# Patient Record
Sex: FEMALE | Race: WHITE | NOT HISPANIC OR LATINO
[De-identification: names, ages, dates, MRNs, and addresses within clinical notes are randomized per-mention and may not be internally consistent; named-entity substitution may affect disease eponyms.]

---

## 2017-06-28 ENCOUNTER — RESULT REVIEW (OUTPATIENT)
Age: 72
End: 2017-06-28

## 2018-10-03 ENCOUNTER — OUTPATIENT (OUTPATIENT)
Dept: OUTPATIENT SERVICES | Facility: HOSPITAL | Age: 73
LOS: 1 days | Discharge: HOME | End: 2018-10-03

## 2018-10-03 VITALS
OXYGEN SATURATION: 98 % | DIASTOLIC BLOOD PRESSURE: 81 MMHG | HEIGHT: 66 IN | HEART RATE: 73 BPM | WEIGHT: 129.85 LBS | SYSTOLIC BLOOD PRESSURE: 113 MMHG | TEMPERATURE: 99 F | RESPIRATION RATE: 16 BRPM

## 2018-10-03 DIAGNOSIS — Z98.890 OTHER SPECIFIED POSTPROCEDURAL STATES: Chronic | ICD-10-CM

## 2018-10-03 DIAGNOSIS — Z01.818 ENCOUNTER FOR OTHER PREPROCEDURAL EXAMINATION: ICD-10-CM

## 2018-10-03 DIAGNOSIS — F32.9 MAJOR DEPRESSIVE DISORDER, SINGLE EPISODE, UNSPECIFIED: ICD-10-CM

## 2018-10-03 DIAGNOSIS — M16.11 UNILATERAL PRIMARY OSTEOARTHRITIS, RIGHT HIP: ICD-10-CM

## 2018-10-03 LAB
ANION GAP SERPL CALC-SCNC: 4 MMOL/L — LOW (ref 5–17)
APTT BLD: 36.5 SEC — SIGNIFICANT CHANGE UP (ref 27.5–37.4)
BASOPHILS # BLD AUTO: 0.04 K/UL — SIGNIFICANT CHANGE UP (ref 0–0.2)
BASOPHILS NFR BLD AUTO: 0.8 % — SIGNIFICANT CHANGE UP (ref 0–2)
BLD GP AB SCN SERPL QL: SIGNIFICANT CHANGE UP
BUN SERPL-MCNC: 15 MG/DL — SIGNIFICANT CHANGE UP (ref 7–23)
CALCIUM SERPL-MCNC: 8.5 MG/DL — SIGNIFICANT CHANGE UP (ref 8.5–10.1)
CHLORIDE SERPL-SCNC: 104 MMOL/L — SIGNIFICANT CHANGE UP (ref 96–108)
CO2 SERPL-SCNC: 31 MMOL/L — SIGNIFICANT CHANGE UP (ref 22–31)
CREAT SERPL-MCNC: 0.93 MG/DL — SIGNIFICANT CHANGE UP (ref 0.5–1.3)
EOSINOPHIL # BLD AUTO: 0.09 K/UL — SIGNIFICANT CHANGE UP (ref 0–0.5)
EOSINOPHIL NFR BLD AUTO: 1.8 % — SIGNIFICANT CHANGE UP (ref 0–6)
GLUCOSE SERPL-MCNC: 103 MG/DL — HIGH (ref 70–99)
HBA1C BLD-MCNC: 5.5 % — SIGNIFICANT CHANGE UP (ref 4–5.6)
HCT VFR BLD CALC: 46.7 % — HIGH (ref 34.5–45)
HGB BLD-MCNC: 14.7 G/DL — SIGNIFICANT CHANGE UP (ref 11.5–15.5)
IMM GRANULOCYTES NFR BLD AUTO: 0.2 % — SIGNIFICANT CHANGE UP (ref 0–1.5)
INR BLD: 1.03 RATIO — SIGNIFICANT CHANGE UP (ref 0.88–1.16)
LYMPHOCYTES # BLD AUTO: 1.1 K/UL — SIGNIFICANT CHANGE UP (ref 1–3.3)
LYMPHOCYTES # BLD AUTO: 21.7 % — SIGNIFICANT CHANGE UP (ref 13–44)
MCHC RBC-ENTMCNC: 27.5 PG — SIGNIFICANT CHANGE UP (ref 27–34)
MCHC RBC-ENTMCNC: 31.5 GM/DL — LOW (ref 32–36)
MCV RBC AUTO: 87.3 FL — SIGNIFICANT CHANGE UP (ref 80–100)
MONOCYTES # BLD AUTO: 0.49 K/UL — SIGNIFICANT CHANGE UP (ref 0–0.9)
MONOCYTES NFR BLD AUTO: 9.7 % — SIGNIFICANT CHANGE UP (ref 2–14)
NEUTROPHILS # BLD AUTO: 3.34 K/UL — SIGNIFICANT CHANGE UP (ref 1.8–7.4)
NEUTROPHILS NFR BLD AUTO: 65.8 % — SIGNIFICANT CHANGE UP (ref 43–77)
NRBC # BLD: 0 /100 WBCS — SIGNIFICANT CHANGE UP (ref 0–0)
PLATELET # BLD AUTO: 301 K/UL — SIGNIFICANT CHANGE UP (ref 150–400)
POTASSIUM SERPL-MCNC: 4.4 MMOL/L — SIGNIFICANT CHANGE UP (ref 3.5–5.3)
POTASSIUM SERPL-SCNC: 4.4 MMOL/L — SIGNIFICANT CHANGE UP (ref 3.5–5.3)
PROTHROM AB SERPL-ACNC: 11.2 SEC — SIGNIFICANT CHANGE UP (ref 9.8–12.7)
RBC # BLD: 5.35 M/UL — HIGH (ref 3.8–5.2)
RBC # FLD: 13.5 % — SIGNIFICANT CHANGE UP (ref 10.3–14.5)
SODIUM SERPL-SCNC: 139 MMOL/L — SIGNIFICANT CHANGE UP (ref 135–145)
WBC # BLD: 5.07 K/UL — SIGNIFICANT CHANGE UP (ref 3.8–10.5)
WBC # FLD AUTO: 5.07 K/UL — SIGNIFICANT CHANGE UP (ref 3.8–10.5)

## 2018-10-03 NOTE — OCCUPATIONAL THERAPY INITIAL EVALUATION ADULT - FINE MOTOR COORDINATION, FINE MOTOR COORDINATION TESTS, OT EVAL
Patient-specific activity scoring scheme (Point to one number): 0 -------5-------- 10 (0) =Unable to perform activity (10) -- Able to perform activity at the same level as before injury or problem. Activity: All movements___5_____

## 2018-10-03 NOTE — H&P PST ADULT - PSH
History of hand surgery  Right & Left  S/P arthroscopic surgery of right knee  1998  S/P arthroscopy of right shoulder  About 5 years ago

## 2018-10-03 NOTE — H&P PST ADULT - HISTORY OF PRESENT ILLNESS
72F pmh depression, c/o right hip pain found to have primary osteoarthritis of right hip here for Right total hip arthroplasty

## 2018-10-03 NOTE — H&P PST ADULT - ASSESSMENT
72F pmh depression, c/o right hip pain found to have primary osteoarthritis of right hip here for Right total hip arthroplasty  CAPRINI SCORE    AGE RELATED RISK FACTORS                                                       MOBILITY RELATED FACTORS  [ ] Age 41-60 years                                            (1 Point)                  [ ] Bed rest                                                        (1 Point)  [ ] Age: 61-74 years                                           (2 Points)                [ ] Plaster cast                                                   (2 Points)  [ ] Age= 75 years                                              (3 Points)                 [ ] Bed bound for more than 72 hours                   (2 Points)    DISEASE RELATED RISK FACTORS                                               GENDER SPECIFIC FACTORS  [ ] Edema in the lower extremities                       (1 Point)                  [ ] Pregnancy                                                     (1 Point)  [ ] Varicose veins                                               (1 Point)                  [ ] Post-partum < 6 weeks                                   (1 Point)             [ ] BMI > 25 Kg/m2                                            (1 Point)                  [ ] Hormonal therapy  or oral contraception            (1 Point)                 [ ] Sepsis (in the previous month)                        (1 Point)                  [ ] History of pregnancy complications  [ ] Pneumonia or serious lung disease                                               [ ] Unexplained or recurrent                       (1 Point)           (in the previous month)                               (1 Point)  [ ] Abnormal pulmonary function test                     (1 Point)                 SURGERY RELATED RISK FACTORS  [ ] Acute myocardial infarction                              (1 Point)                 [ ]  Section                                            (1 Point)  [ ] Congestive heart failure (in the previous month)  (1 Point)                 [ ] Minor surgery                                                 (1 Point)   [ ] Inflammatory bowel disease                             (1 Point)                 [ ] Arthroscopic surgery                                        (2 Points)  [ ] Central venous access                                    (2 Points)                [ ] General surgery lasting more than 45 minutes   (2 Points)       [ ] Stroke (in the previous month)                          (5 Points)               [ ] Elective arthroplasty                                        (5 Points)                                                                                                                                               HEMATOLOGY RELATED FACTORS                                                 TRAUMA RELATED RISK FACTORS  [ ] Prior episodes of VTE                                     (3 Points)                 [ ] Fracture of the hip, pelvis, or leg                       (5 Points)  [ ] Positive family history for VTE                         (3 Points)                 [ ] Acute spinal cord injury (in the previous month)  (5 Points)  [ ] Prothrombin 08893 A                                      (3 Points)                 [ ] Paralysis  (less than 1 month)                          (5 Points)  [ ] Factor V Leiden                                             (3 Points)                 [ ] Multiple Trauma within 1 month                         (5 Points)  [ ] Lupus anticoagulants                                     (3 Points)                                                           [ ] Anticardiolipin antibodies                                (3 Points)                                                       [ ] High homocysteine in the blood                      (3 Points)                                             [ ] Other congenital or acquired thrombophilia       (3 Points)                                                [ ] Heparin induced thrombocytopenia                  (3 Points)                                          Total Score [          ] 72F pmh depression, c/o right hip pain found to have primary osteoarthritis of right hip here for Right total hip arthroplasty  CAPRINI SCORE    AGE RELATED RISK FACTORS                                                       MOBILITY RELATED FACTORS  [ ] Age 41-60 years                                            (1 Point)                  [ ] Bed rest                                                        (1 Point)  [x ] Age: 61-74 years                                           (2 Points)                [ ] Plaster cast                                                   (2 Points)  [ ] Age= 75 years                                              (3 Points)                 [ ] Bed bound for more than 72 hours                   (2 Points)    DISEASE RELATED RISK FACTORS                                               GENDER SPECIFIC FACTORS  [ ] Edema in the lower extremities                       (1 Point)                  [ ] Pregnancy                                                     (1 Point)  [ ] Varicose veins                                               (1 Point)                  [ ] Post-partum < 6 weeks                                   (1 Point)             [ ] BMI > 25 Kg/m2                                            (1 Point)                  [ ] Hormonal therapy  or oral contraception            (1 Point)                 [ ] Sepsis (in the previous month)                        (1 Point)                  [ ] History of pregnancy complications  [ ] Pneumonia or serious lung disease                                               [ ] Unexplained or recurrent                       (1 Point)           (in the previous month)                               (1 Point)  [ ] Abnormal pulmonary function test                     (1 Point)                 SURGERY RELATED RISK FACTORS  [ ] Acute myocardial infarction                              (1 Point)                 [ ]  Section                                            (1 Point)  [ ] Congestive heart failure (in the previous month)  (1 Point)                 [ ] Minor surgery                                                 (1 Point)   [ ] Inflammatory bowel disease                             (1 Point)                 [ ] Arthroscopic surgery                                        (2 Points)  [ ] Central venous access                                    (2 Points)                [ ] General surgery lasting more than 45 minutes   (2 Points)       [ ] Stroke (in the previous month)                          (5 Points)               [x ] Elective arthroplasty                                        (5 Points)                                                                                                                                               HEMATOLOGY RELATED FACTORS                                                 TRAUMA RELATED RISK FACTORS  [ ] Prior episodes of VTE                                     (3 Points)                 [ ] Fracture of the hip, pelvis, or leg                       (5 Points)  [ ] Positive family history for VTE                         (3 Points)                 [ ] Acute spinal cord injury (in the previous month)  (5 Points)  [ ] Prothrombin 95491 A                                      (3 Points)                 [ ] Paralysis  (less than 1 month)                          (5 Points)  [ ] Factor V Leiden                                             (3 Points)                 [ ] Multiple Trauma within 1 month                         (5 Points)  [ ] Lupus anticoagulants                                     (3 Points)                                                           [ ] Anticardiolipin antibodies                                (3 Points)                                                       [ ] High homocysteine in the blood                      (3 Points)                                             [ ] Other congenital or acquired thrombophilia       (3 Points)                                                [ ] Heparin induced thrombocytopenia                  (3 Points)                                          Total Score [    7      ]

## 2018-10-03 NOTE — OCCUPATIONAL THERAPY INITIAL EVALUATION ADULT - ADDITIONAL COMMENTS
Patient lives in a private house with 2 steps to enter with no rails. Once inside, the patient has 13 steps with bilateral handrails to reach main floor where bedroom and bathroom is. The patients bathroom has a tub/shower combination with a regular toilet. The patient reports having a built in tub bench. The patient ambulates with no device and owns crutches. The patient is R handed, wears glasses all the time and drives. The patient reports having 10/10 pain in R hip at all times and reports using oxycodone for pain management.

## 2018-10-03 NOTE — OCCUPATIONAL THERAPY INITIAL EVALUATION ADULT - SOCIAL CONCERNS
Complex psychosocial needs/coping issues/Patient has no one to assist after surgery. Patient reported to OT "I have had two family members of mine die in the past year. After they , I didn't really eat because I was depressed and became as thin as I am now." OT recommends social work consult when inpatient.

## 2018-10-03 NOTE — PHYSICAL THERAPY INITIAL EVALUATION ADULT - MODIFIED CLINICAL TEST OF SENSORY INTEGRATION IN BALANCE TEST
5x sit to stand = 34.53 seconds. 2MWT = 480ft without device (decreased arm swing, R heel whip). Stairs: step-to-step with R rail for ascent.

## 2018-10-03 NOTE — H&P PST ADULT - NSANTHOSAYNRD_GEN_A_CORE
No. ISMAEL screening performed.  STOP BANG Legend: 0-2 = LOW Risk; 3-4 = INTERMEDIATE Risk; 5-8 = HIGH Risk

## 2018-10-04 LAB
MRSA PCR RESULT.: SIGNIFICANT CHANGE UP
S AUREUS DNA NOSE QL NAA+PROBE: SIGNIFICANT CHANGE UP

## 2018-10-04 RX ORDER — SODIUM CHLORIDE 9 MG/ML
3 INJECTION INTRAMUSCULAR; INTRAVENOUS; SUBCUTANEOUS EVERY 8 HOURS
Qty: 0 | Refills: 0 | Status: DISCONTINUED | OUTPATIENT
Start: 2018-11-21 | End: 2018-11-22

## 2018-11-07 ENCOUNTER — OUTPATIENT (OUTPATIENT)
Dept: OUTPATIENT SERVICES | Facility: HOSPITAL | Age: 73
LOS: 1 days | Discharge: HOME | End: 2018-11-07

## 2018-11-07 VITALS
DIASTOLIC BLOOD PRESSURE: 75 MMHG | HEIGHT: 66 IN | WEIGHT: 134.92 LBS | SYSTOLIC BLOOD PRESSURE: 133 MMHG | RESPIRATION RATE: 16 BRPM | TEMPERATURE: 98 F | OXYGEN SATURATION: 97 % | HEART RATE: 76 BPM

## 2018-11-07 DIAGNOSIS — Z98.890 OTHER SPECIFIED POSTPROCEDURAL STATES: Chronic | ICD-10-CM

## 2018-11-07 DIAGNOSIS — Z01.818 ENCOUNTER FOR OTHER PREPROCEDURAL EXAMINATION: ICD-10-CM

## 2018-11-07 LAB
ANION GAP SERPL CALC-SCNC: 9 MMOL/L — SIGNIFICANT CHANGE UP (ref 5–17)
APTT BLD: 37 SEC — HIGH (ref 27.5–36.3)
BASOPHILS # BLD AUTO: 0.06 K/UL — SIGNIFICANT CHANGE UP (ref 0–0.2)
BASOPHILS NFR BLD AUTO: 1.3 % — SIGNIFICANT CHANGE UP (ref 0–2)
BLD GP AB SCN SERPL QL: SIGNIFICANT CHANGE UP
BUN SERPL-MCNC: 22 MG/DL — SIGNIFICANT CHANGE UP (ref 7–23)
CALCIUM SERPL-MCNC: 9.1 MG/DL — SIGNIFICANT CHANGE UP (ref 8.5–10.1)
CHLORIDE SERPL-SCNC: 106 MMOL/L — SIGNIFICANT CHANGE UP (ref 96–108)
CO2 SERPL-SCNC: 27 MMOL/L — SIGNIFICANT CHANGE UP (ref 22–31)
CREAT SERPL-MCNC: 1 MG/DL — SIGNIFICANT CHANGE UP (ref 0.5–1.3)
EOSINOPHIL # BLD AUTO: 0.1 K/UL — SIGNIFICANT CHANGE UP (ref 0–0.5)
EOSINOPHIL NFR BLD AUTO: 2.1 % — SIGNIFICANT CHANGE UP (ref 0–6)
GLUCOSE SERPL-MCNC: 109 MG/DL — HIGH (ref 70–99)
HBA1C BLD-MCNC: 5.5 % — SIGNIFICANT CHANGE UP (ref 4–5.6)
HCT VFR BLD CALC: 45.2 % — HIGH (ref 34.5–45)
HGB BLD-MCNC: 14.2 G/DL — SIGNIFICANT CHANGE UP (ref 11.5–15.5)
IMM GRANULOCYTES NFR BLD AUTO: 0.4 % — SIGNIFICANT CHANGE UP (ref 0–1.5)
INR BLD: 1 RATIO — SIGNIFICANT CHANGE UP (ref 0.88–1.16)
LYMPHOCYTES # BLD AUTO: 1.27 K/UL — SIGNIFICANT CHANGE UP (ref 1–3.3)
LYMPHOCYTES # BLD AUTO: 26.8 % — SIGNIFICANT CHANGE UP (ref 13–44)
MCHC RBC-ENTMCNC: 27 PG — SIGNIFICANT CHANGE UP (ref 27–34)
MCHC RBC-ENTMCNC: 31.4 GM/DL — LOW (ref 32–36)
MCV RBC AUTO: 85.9 FL — SIGNIFICANT CHANGE UP (ref 80–100)
MONOCYTES # BLD AUTO: 0.46 K/UL — SIGNIFICANT CHANGE UP (ref 0–0.9)
MONOCYTES NFR BLD AUTO: 9.7 % — SIGNIFICANT CHANGE UP (ref 2–14)
MRSA PCR RESULT.: SIGNIFICANT CHANGE UP
NEUTROPHILS # BLD AUTO: 2.83 K/UL — SIGNIFICANT CHANGE UP (ref 1.8–7.4)
NEUTROPHILS NFR BLD AUTO: 59.7 % — SIGNIFICANT CHANGE UP (ref 43–77)
NRBC # BLD: 0 /100 WBCS — SIGNIFICANT CHANGE UP (ref 0–0)
PLATELET # BLD AUTO: 307 K/UL — SIGNIFICANT CHANGE UP (ref 150–400)
POTASSIUM SERPL-MCNC: 4.7 MMOL/L — SIGNIFICANT CHANGE UP (ref 3.5–5.3)
POTASSIUM SERPL-SCNC: 4.7 MMOL/L — SIGNIFICANT CHANGE UP (ref 3.5–5.3)
PROTHROM AB SERPL-ACNC: 11.2 SEC — SIGNIFICANT CHANGE UP (ref 10–12.9)
RBC # BLD: 5.26 M/UL — HIGH (ref 3.8–5.2)
RBC # FLD: 13.8 % — SIGNIFICANT CHANGE UP (ref 10.3–14.5)
S AUREUS DNA NOSE QL NAA+PROBE: SIGNIFICANT CHANGE UP
SODIUM SERPL-SCNC: 142 MMOL/L — SIGNIFICANT CHANGE UP (ref 135–145)
WBC # BLD: 4.74 K/UL — SIGNIFICANT CHANGE UP (ref 3.8–10.5)
WBC # FLD AUTO: 4.74 K/UL — SIGNIFICANT CHANGE UP (ref 3.8–10.5)

## 2018-11-07 RX ORDER — SODIUM CHLORIDE 9 MG/ML
3 INJECTION INTRAMUSCULAR; INTRAVENOUS; SUBCUTANEOUS EVERY 8 HOURS
Qty: 0 | Refills: 0 | Status: DISCONTINUED | OUTPATIENT
Start: 2018-11-21 | End: 2018-11-21

## 2018-11-07 NOTE — PHYSICAL THERAPY INITIAL EVALUATION ADULT - IMPAIRMENTS FOUND, PT EVAL
ergonomics and body mechanics/gait, locomotion, and balance/aerobic capacity/endurance/muscle strength/posture/ROM

## 2018-11-07 NOTE — H&P PST ADULT - HISTORY OF PRESENT ILLNESS
72F pmh depression, arthritis c/o right hip pain found to have primary osteoarthritis of right hip here for PST for scheduled Right total hip arthroplasty

## 2018-11-07 NOTE — H&P PST ADULT - ASSESSMENT
72F pmh depression, c/o right hip pain found to have primary osteoarthritis of right hip here for Right total hip arthroplasty  CAPRINI SCORE    AGE RELATED RISK FACTORS                                                       MOBILITY RELATED FACTORS  [ ] Age 41-60 years                                            (1 Point)                  [ ] Bed rest                                                        (1 Point)  [x ] Age: 61-74 years                                           (2 Points)                [ ] Plaster cast                                                   (2 Points)  [ ] Age= 75 years                                              (3 Points)                 [ ] Bed bound for more than 72 hours                   (2 Points)    DISEASE RELATED RISK FACTORS                                               GENDER SPECIFIC FACTORS  [x ] Edema in the lower extremities                       (1 Point)                  [ ] Pregnancy                                                     (1 Point)  [ ] Varicose veins                                               (1 Point)                  [ ] Post-partum < 6 weeks                                   (1 Point)             [ ] BMI > 25 Kg/m2                                            (1 Point)                  [ ] Hormonal therapy  or oral contraception            (1 Point)                 [ ] Sepsis (in the previous month)                        (1 Point)                  [ ] History of pregnancy complications  [ ] Pneumonia or serious lung disease                                               [ ] Unexplained or recurrent                       (1 Point)           (in the previous month)                               (1 Point)  [ ] Abnormal pulmonary function test                     (1 Point)                 SURGERY RELATED RISK FACTORS  [ ] Acute myocardial infarction                              (1 Point)                 [ ]  Section                                            (1 Point)  [ ] Congestive heart failure (in the previous month)  (1 Point)                 [ ] Minor surgery                                                 (1 Point)   [ ] Inflammatory bowel disease                             (1 Point)                 [ ] Arthroscopic surgery                                        (2 Points)  [ ] Central venous access                                    (2 Points)                [ ] General surgery lasting more than 45 minutes   (2 Points)       [ ] Stroke (in the previous month)                          (5 Points)               [x ] Elective arthroplasty                                        (5 Points)                                                                                                                                               HEMATOLOGY RELATED FACTORS                                                 TRAUMA RELATED RISK FACTORS  [ ] Prior episodes of VTE                                     (3 Points)                 [ ] Fracture of the hip, pelvis, or leg                       (5 Points)  [ ] Positive family history for VTE                         (3 Points)                 [ ] Acute spinal cord injury (in the previous month)  (5 Points)  [ ] Prothrombin 70302 A                                      (3 Points)                 [ ] Paralysis  (less than 1 month)                          (5 Points)  [ ] Factor V Leiden                                             (3 Points)                 [ ] Multiple Trauma within 1 month                         (5 Points)  [ ] Lupus anticoagulants                                     (3 Points)                                                           [ ] Anticardiolipin antibodies                                (3 Points)                                                       [ ] High homocysteine in the blood                      (3 Points)                                             [ ] Other congenital or acquired thrombophilia       (3 Points)                                                [ ] Heparin induced thrombocytopenia                  (3 Points)                                          Total Score [    8     ]

## 2018-11-07 NOTE — H&P PST ADULT - PROBLEM SELECTOR PLAN 1
Right total hip arthroplasty  Pre-op instructions given by RN, patient verbalized understanding  Chlorhexidine wash instructions given   Pending: Medical clearance requested

## 2018-11-07 NOTE — OCCUPATIONAL THERAPY INITIAL EVALUATION ADULT - ADDITIONAL COMMENTS
Patient was seen for PST on Patient was seen for PST on 10/3/18 for R THR. Patient was scheduled to have R THR done on 10/17/18, but fell onto bush during lawn work and reported "I scratched my whole entire arm, so they had to cancel the surgery". Patient also reported to OT that she diagnosed with a L rotator cuff tear as a result of the fall.  As per last pre-op and patient, Patient lives in a private house with 2 steps to enter with no rails. Once inside, the patient has 13 steps with bilateral handrails to reach main floor where bedroom and bathroom is. The patients bathroom has a tub/shower combination with a regular toilet. The patient reports having a built in tub bench. The patient ambulates with no device and owns crutches. The patient is R handed, wears glasses all the time and drives. The patient reports having 10/10 pain in R hip at all times and reports using oxycodone for pain management.

## 2018-11-07 NOTE — PHYSICAL THERAPY INITIAL EVALUATION ADULT - ADDITIONAL COMMENTS
Pt lives in private house c 2 steps, w/o rail, to get into house and there is 13 steps c R rail to negotiate at home. Pt was advised to install rails to get into house safely but pt refused.  Pt has no one to assist in post op care. Pt prefers to go to subacute rehab. Pt has axillary crutches and received Rolling Walker and  3-1commode after last PST on Oct. 18th. Pt reported fell and injured L rotator cuff after last PST and hip surgery was cancelled.

## 2018-11-07 NOTE — OCCUPATIONAL THERAPY INITIAL EVALUATION ADULT - FINE MOTOR COORDINATION, FINE MOTOR COORDINATION TESTS, OT EVAL
Patient-specific activity scoring scheme (Point to one number): 0 -------5-------- 10 (0) =Unable to perform activity (10) -- Able to perform activity at the same level as before injury or problem. Activity: Walking_____4____

## 2018-11-08 LAB
HCV AB S/CO SERPL IA: 0.07 S/CO — SIGNIFICANT CHANGE UP
HCV AB SERPL-IMP: SIGNIFICANT CHANGE UP

## 2018-11-20 ENCOUNTER — RESULT REVIEW (OUTPATIENT)
Age: 73
End: 2018-11-20

## 2018-11-20 ENCOUNTER — TRANSCRIPTION ENCOUNTER (OUTPATIENT)
Age: 73
End: 2018-11-20

## 2018-11-21 ENCOUNTER — INPATIENT (INPATIENT)
Facility: HOSPITAL | Age: 73
LOS: 0 days | Discharge: NURSING, REHAB OR LONG-TERM CARE | End: 2018-11-22
Attending: ORTHOPAEDIC SURGERY | Admitting: ORTHOPAEDIC SURGERY
Payer: MEDICARE

## 2018-11-21 ENCOUNTER — TRANSCRIPTION ENCOUNTER (OUTPATIENT)
Age: 73
End: 2018-11-21

## 2018-11-21 VITALS
HEART RATE: 81 BPM | TEMPERATURE: 98 F | WEIGHT: 135.14 LBS | OXYGEN SATURATION: 96 % | DIASTOLIC BLOOD PRESSURE: 69 MMHG | RESPIRATION RATE: 18 BRPM | HEIGHT: 66 IN | SYSTOLIC BLOOD PRESSURE: 163 MMHG

## 2018-11-21 DIAGNOSIS — Z98.890 OTHER SPECIFIED POSTPROCEDURAL STATES: Chronic | ICD-10-CM

## 2018-11-21 PROBLEM — C44.300 UNSPECIFIED MALIGNANT NEOPLASM OF SKIN OF UNSPECIFIED PART OF FACE: Chronic | Status: ACTIVE | Noted: 2018-11-07

## 2018-11-21 PROBLEM — B19.10 UNSPECIFIED VIRAL HEPATITIS B WITHOUT HEPATIC COMA: Chronic | Status: ACTIVE | Noted: 2018-11-07

## 2018-11-21 LAB
BLD GP AB SCN SERPL QL: SIGNIFICANT CHANGE UP
HCT VFR BLD CALC: 43.2 % — SIGNIFICANT CHANGE UP (ref 34.5–45)
HGB BLD-MCNC: 13.6 G/DL — SIGNIFICANT CHANGE UP (ref 11.5–15.5)
MCHC RBC-ENTMCNC: 27.5 PG — SIGNIFICANT CHANGE UP (ref 27–34)
MCHC RBC-ENTMCNC: 31.5 GM/DL — LOW (ref 32–36)
MCV RBC AUTO: 87.3 FL — SIGNIFICANT CHANGE UP (ref 80–100)
NRBC # BLD: 0 /100 WBCS — SIGNIFICANT CHANGE UP (ref 0–0)
PLATELET # BLD AUTO: 265 K/UL — SIGNIFICANT CHANGE UP (ref 150–400)
RBC # BLD: 4.95 M/UL — SIGNIFICANT CHANGE UP (ref 3.8–5.2)
RBC # FLD: 14 % — SIGNIFICANT CHANGE UP (ref 10.3–14.5)
WBC # BLD: 10.07 K/UL — SIGNIFICANT CHANGE UP (ref 3.8–10.5)
WBC # FLD AUTO: 10.07 K/UL — SIGNIFICANT CHANGE UP (ref 3.8–10.5)

## 2018-11-21 PROCEDURE — 72170 X-RAY EXAM OF PELVIS: CPT | Mod: 26

## 2018-11-21 PROCEDURE — 99222 1ST HOSP IP/OBS MODERATE 55: CPT

## 2018-11-21 RX ORDER — DULOXETINE HYDROCHLORIDE 30 MG/1
1 CAPSULE, DELAYED RELEASE ORAL
Qty: 0 | Refills: 0 | COMMUNITY

## 2018-11-21 RX ORDER — LAMOTRIGINE 25 MG/1
100 TABLET, ORALLY DISINTEGRATING ORAL
Qty: 0 | Refills: 0 | Status: DISCONTINUED | OUTPATIENT
Start: 2018-11-21 | End: 2018-11-22

## 2018-11-21 RX ORDER — ACETAMINOPHEN 500 MG
650 TABLET ORAL ONCE
Qty: 0 | Refills: 0 | Status: COMPLETED | OUTPATIENT
Start: 2018-11-21 | End: 2018-11-21

## 2018-11-21 RX ORDER — OXYCODONE HYDROCHLORIDE 5 MG/1
10 TABLET ORAL EVERY 4 HOURS
Qty: 0 | Refills: 0 | Status: DISCONTINUED | OUTPATIENT
Start: 2018-11-21 | End: 2018-11-22

## 2018-11-21 RX ORDER — SODIUM CHLORIDE 9 MG/ML
1000 INJECTION, SOLUTION INTRAVENOUS
Qty: 0 | Refills: 0 | Status: DISCONTINUED | OUTPATIENT
Start: 2018-11-21 | End: 2018-11-22

## 2018-11-21 RX ORDER — CEFAZOLIN SODIUM 1 G
2000 VIAL (EA) INJECTION EVERY 8 HOURS
Qty: 0 | Refills: 0 | Status: COMPLETED | OUTPATIENT
Start: 2018-11-21 | End: 2018-11-21

## 2018-11-21 RX ORDER — ONDANSETRON 8 MG/1
4 TABLET, FILM COATED ORAL EVERY 6 HOURS
Qty: 0 | Refills: 0 | Status: DISCONTINUED | OUTPATIENT
Start: 2018-11-21 | End: 2018-11-22

## 2018-11-21 RX ORDER — MAGNESIUM HYDROXIDE 400 MG/1
30 TABLET, CHEWABLE ORAL DAILY
Qty: 0 | Refills: 0 | Status: DISCONTINUED | OUTPATIENT
Start: 2018-11-21 | End: 2018-11-22

## 2018-11-21 RX ORDER — ASCORBIC ACID 60 MG
500 TABLET,CHEWABLE ORAL
Qty: 0 | Refills: 0 | Status: DISCONTINUED | OUTPATIENT
Start: 2018-11-21 | End: 2018-11-22

## 2018-11-21 RX ORDER — DULOXETINE HYDROCHLORIDE 30 MG/1
30 CAPSULE, DELAYED RELEASE ORAL DAILY
Qty: 0 | Refills: 0 | Status: DISCONTINUED | OUTPATIENT
Start: 2018-11-21 | End: 2018-11-21

## 2018-11-21 RX ORDER — ACETAMINOPHEN 500 MG
650 TABLET ORAL EVERY 6 HOURS
Qty: 0 | Refills: 0 | Status: DISCONTINUED | OUTPATIENT
Start: 2018-11-21 | End: 2018-11-22

## 2018-11-21 RX ORDER — PANTOPRAZOLE SODIUM 20 MG/1
40 TABLET, DELAYED RELEASE ORAL
Qty: 0 | Refills: 0 | Status: DISCONTINUED | OUTPATIENT
Start: 2018-11-21 | End: 2018-11-22

## 2018-11-21 RX ORDER — TRANEXAMIC ACID 100 MG/ML
1000 INJECTION, SOLUTION INTRAVENOUS ONCE
Qty: 0 | Refills: 0 | Status: COMPLETED | OUTPATIENT
Start: 2018-11-21 | End: 2018-11-21

## 2018-11-21 RX ORDER — DEXAMETHASONE 0.5 MG/5ML
10 ELIXIR ORAL ONCE
Qty: 0 | Refills: 0 | Status: COMPLETED | OUTPATIENT
Start: 2018-11-22 | End: 2018-11-22

## 2018-11-21 RX ORDER — SENNA PLUS 8.6 MG/1
2 TABLET ORAL AT BEDTIME
Qty: 0 | Refills: 0 | Status: DISCONTINUED | OUTPATIENT
Start: 2018-11-21 | End: 2018-11-22

## 2018-11-21 RX ORDER — CELECOXIB 200 MG/1
200 CAPSULE ORAL
Qty: 0 | Refills: 0 | Status: DISCONTINUED | OUTPATIENT
Start: 2018-11-22 | End: 2018-11-22

## 2018-11-21 RX ORDER — HYDROMORPHONE HYDROCHLORIDE 2 MG/ML
0.5 INJECTION INTRAMUSCULAR; INTRAVENOUS; SUBCUTANEOUS
Qty: 0 | Refills: 0 | Status: DISCONTINUED | OUTPATIENT
Start: 2018-11-21 | End: 2018-11-21

## 2018-11-21 RX ORDER — LAMOTRIGINE 25 MG/1
200 TABLET, ORALLY DISINTEGRATING ORAL AT BEDTIME
Qty: 0 | Refills: 0 | Status: DISCONTINUED | OUTPATIENT
Start: 2018-11-21 | End: 2018-11-21

## 2018-11-21 RX ORDER — SODIUM CHLORIDE 9 MG/ML
1000 INJECTION, SOLUTION INTRAVENOUS
Qty: 0 | Refills: 0 | Status: DISCONTINUED | OUTPATIENT
Start: 2018-11-21 | End: 2018-11-21

## 2018-11-21 RX ORDER — OXYCODONE HYDROCHLORIDE 5 MG/1
5 TABLET ORAL EVERY 4 HOURS
Qty: 0 | Refills: 0 | Status: DISCONTINUED | OUTPATIENT
Start: 2018-11-21 | End: 2018-11-22

## 2018-11-21 RX ORDER — DOCUSATE SODIUM 100 MG
100 CAPSULE ORAL THREE TIMES A DAY
Qty: 0 | Refills: 0 | Status: DISCONTINUED | OUTPATIENT
Start: 2018-11-21 | End: 2018-11-22

## 2018-11-21 RX ORDER — HYDROMORPHONE HYDROCHLORIDE 2 MG/ML
1 INJECTION INTRAMUSCULAR; INTRAVENOUS; SUBCUTANEOUS
Qty: 0 | Refills: 0 | Status: DISCONTINUED | OUTPATIENT
Start: 2018-11-21 | End: 2018-11-21

## 2018-11-21 RX ORDER — POLYETHYLENE GLYCOL 3350 17 G/17G
17 POWDER, FOR SOLUTION ORAL DAILY
Qty: 0 | Refills: 0 | Status: DISCONTINUED | OUTPATIENT
Start: 2018-11-21 | End: 2018-11-22

## 2018-11-21 RX ORDER — CELECOXIB 200 MG/1
200 CAPSULE ORAL ONCE
Qty: 0 | Refills: 0 | Status: COMPLETED | OUTPATIENT
Start: 2018-11-21 | End: 2018-11-21

## 2018-11-21 RX ORDER — MORPHINE SULFATE 50 MG/1
4 CAPSULE, EXTENDED RELEASE ORAL ONCE
Qty: 0 | Refills: 0 | Status: DISCONTINUED | OUTPATIENT
Start: 2018-11-21 | End: 2018-11-22

## 2018-11-21 RX ORDER — ASPIRIN/CALCIUM CARB/MAGNESIUM 324 MG
325 TABLET ORAL
Qty: 0 | Refills: 0 | Status: DISCONTINUED | OUTPATIENT
Start: 2018-11-22 | End: 2018-11-22

## 2018-11-21 RX ORDER — BENZOCAINE AND MENTHOL 5; 1 G/100ML; G/100ML
1 LIQUID ORAL
Qty: 0 | Refills: 0 | Status: DISCONTINUED | OUTPATIENT
Start: 2018-11-21 | End: 2018-11-22

## 2018-11-21 RX ORDER — ONDANSETRON 8 MG/1
4 TABLET, FILM COATED ORAL ONCE
Qty: 0 | Refills: 0 | Status: DISCONTINUED | OUTPATIENT
Start: 2018-11-21 | End: 2018-11-21

## 2018-11-21 RX ORDER — DULOXETINE HYDROCHLORIDE 30 MG/1
30 CAPSULE, DELAYED RELEASE ORAL
Qty: 0 | Refills: 0 | Status: DISCONTINUED | OUTPATIENT
Start: 2018-11-21 | End: 2018-11-22

## 2018-11-21 RX ADMIN — Medication 650 MILLIGRAM(S): at 19:24

## 2018-11-21 RX ADMIN — TRANEXAMIC ACID 220 MILLIGRAM(S): 100 INJECTION, SOLUTION INTRAVENOUS at 10:34

## 2018-11-21 RX ADMIN — CELECOXIB 200 MILLIGRAM(S): 200 CAPSULE ORAL at 07:17

## 2018-11-21 RX ADMIN — Medication 650 MILLIGRAM(S): at 18:24

## 2018-11-21 RX ADMIN — Medication 100 MILLIGRAM(S): at 15:47

## 2018-11-21 RX ADMIN — OXYCODONE HYDROCHLORIDE 5 MILLIGRAM(S): 5 TABLET ORAL at 21:54

## 2018-11-21 RX ADMIN — SODIUM CHLORIDE 3 MILLILITER(S): 9 INJECTION INTRAMUSCULAR; INTRAVENOUS; SUBCUTANEOUS at 13:06

## 2018-11-21 RX ADMIN — LAMOTRIGINE 100 MILLIGRAM(S): 25 TABLET, ORALLY DISINTEGRATING ORAL at 18:24

## 2018-11-21 RX ADMIN — Medication 100 MILLIGRAM(S): at 21:54

## 2018-11-21 RX ADMIN — Medication 500 MILLIGRAM(S): at 18:25

## 2018-11-21 RX ADMIN — OXYCODONE HYDROCHLORIDE 10 MILLIGRAM(S): 5 TABLET ORAL at 11:41

## 2018-11-21 RX ADMIN — Medication 650 MILLIGRAM(S): at 11:41

## 2018-11-21 RX ADMIN — POLYETHYLENE GLYCOL 3350 17 GRAM(S): 17 POWDER, FOR SOLUTION ORAL at 11:41

## 2018-11-21 RX ADMIN — Medication 650 MILLIGRAM(S): at 12:41

## 2018-11-21 RX ADMIN — Medication 650 MILLIGRAM(S): at 23:27

## 2018-11-21 RX ADMIN — Medication 650 MILLIGRAM(S): at 07:17

## 2018-11-21 RX ADMIN — Medication 1 MILLIGRAM(S): at 18:24

## 2018-11-21 RX ADMIN — SODIUM CHLORIDE 3 MILLILITER(S): 9 INJECTION INTRAMUSCULAR; INTRAVENOUS; SUBCUTANEOUS at 21:01

## 2018-11-21 RX ADMIN — PANTOPRAZOLE SODIUM 40 MILLIGRAM(S): 20 TABLET, DELAYED RELEASE ORAL at 11:41

## 2018-11-21 RX ADMIN — Medication 1 TABLET(S): at 11:41

## 2018-11-21 RX ADMIN — DULOXETINE HYDROCHLORIDE 30 MILLIGRAM(S): 30 CAPSULE, DELAYED RELEASE ORAL at 21:54

## 2018-11-21 RX ADMIN — SODIUM CHLORIDE 80 MILLILITER(S): 9 INJECTION, SOLUTION INTRAVENOUS at 09:41

## 2018-11-21 RX ADMIN — HYDROMORPHONE HYDROCHLORIDE 0.5 MILLIGRAM(S): 2 INJECTION INTRAMUSCULAR; INTRAVENOUS; SUBCUTANEOUS at 10:35

## 2018-11-21 RX ADMIN — OXYCODONE HYDROCHLORIDE 10 MILLIGRAM(S): 5 TABLET ORAL at 12:41

## 2018-11-21 RX ADMIN — OXYCODONE HYDROCHLORIDE 5 MILLIGRAM(S): 5 TABLET ORAL at 22:54

## 2018-11-21 NOTE — OCCUPATIONAL THERAPY INITIAL EVALUATION ADULT - ADDITIONAL COMMENTS
pre op assessment-Pt lives alone in a private home, 2 entry steps (no rails), 13 steps (+ B/L rails) to 2nd level. PT recommended railing installation: pt refusing. Pt states she is independent with all functional mobility including community ambulation without a device. Pt states she owns crutches. Pt is right hand dominant, wears eye glasses for reading & distance, drives, & is retired. Pt reports 10/10 with Oxy. Pt requesting rehab due to stairs at home.

## 2018-11-21 NOTE — DISCHARGE NOTE ADULT - MEDICATION SUMMARY - MEDICATIONS TO TAKE
I will START or STAY ON the medications listed below when I get home from the hospital:    celecoxib 200 mg oral capsule  -- 1 cap(s) by mouth 2 times a day  -- Indication: For RIGHT TOTAL HIP ARTHROPLASTY    oxyCODONE 5 mg oral tablet  -- 1 tab(s) by mouth every 4 hours, As needed, Mild Pain (1 - 3)  -- Indication: For RIGHT TOTAL HIP ARTHROPLASTY    oxyCODONE 10 mg oral tablet  -- 1 tab(s) by mouth every 4 hours, As needed, Moderate Pain (4 - 6)  -- Indication: For RIGHT TOTAL HIP ARTHROPLASTY    aspirin 325 mg oral delayed release tablet  -- 1 tab(s) by mouth 2 times a day  -- Indication: For RIGHT TOTAL HIP ARTHROPLASTY    SUMAtriptan 100 mg oral tablet  -- 1 tab(s) by mouth once a day, As needed, Migraine  -- Indication: For RIGHT TOTAL HIP ARTHROPLASTY    lamoTRIgine 200 mg oral tablet, extended release  -- 1 tab(s) by mouth once a day (at bedtime)  -- Indication: For RIGHT TOTAL HIP ARTHROPLASTY    LORazepam 1 mg oral tablet  -- 1 tab(s) by mouth 2 times a day, As Needed  -- Indication: For RIGHT TOTAL HIP ARTHROPLASTY    DULoxetine 30 mg oral delayed release capsule  -- 30 milligram(s) by mouth 3 times a day  -- Indication: For RIGHT TOTAL HIP ARTHROPLASTY    Ambien  -- 1 tab(s) by mouth once a day (at bedtime), As Needed  -- Indication: For RIGHT TOTAL HIP ARTHROPLASTY    docusate sodium 100 mg oral capsule  -- 1 cap(s) by mouth 3 times a day  -- Indication: For RIGHT TOTAL HIP ARTHROPLASTY    pantoprazole 40 mg oral delayed release tablet  -- 1 tab(s) by mouth once a day (before a meal)  -- Indication: For RIGHT TOTAL HIP ARTHROPLASTY    Multiple Vitamins oral tablet  -- 1 tab(s) by mouth once a day  -- Indication: For RIGHT TOTAL HIP ARTHROPLASTY    ascorbic acid 500 mg oral tablet  -- 1 tab(s) by mouth 2 times a day  -- Indication: For RIGHT TOTAL HIP ARTHROPLASTY

## 2018-11-21 NOTE — CONSULT NOTE ADULT - SUBJECTIVE AND OBJECTIVE BOX
HPI:  73F pmh depression, arthritis c/o right hip pain found to have primary osteoarthritis of right hip s/p scheduled Right total hip arthroplasty    Seen at bedside post op no complaints.     PAST MEDICAL & SURGICAL HISTORY:  Skin cancer of face  Hepatitis B: In the 1960&#x27;s  Depression  History of hand surgery: Right &amp; Left  S/P arthroscopy of right shoulder: About 5 years ago  S/P arthroscopic surgery of right knee: 1998      REVIEW OF SYSTEMS:    CONSTITUTIONAL: No fever, weight loss, or fatigue  EYES: No eye pain, visual disturbances, or discharge  ENMT:  No difficulty hearing, tinnitus, vertigo; No sinus or throat pain  NECK: No pain or stiffness  BREASTS: No pain, masses, or nipple discharge  RESPIRATORY: No cough, wheezing, chills or hemoptysis; No shortness of breath  CARDIOVASCULAR: No chest pain, palpitations, dizziness, or leg swelling  GASTROINTESTINAL: No abdominal or epigastric pain. No nausea, vomiting, or hematemesis; No diarrhea or constipation. No melena or hematochezia.  GENITOURINARY: No dysuria, frequency, hematuria, or incontinence  NEUROLOGICAL: No headaches, memory loss, loss of strength, numbness, or tremors  SKIN: No itching, burning, rashes, or lesions   LYMPH NODES: No enlarged glands  ENDOCRINE: No heat or cold intolerance; No hair loss  MUSCULOSKELETAL: No joint pain or swelling; No muscle, back, or extremity pain  PSYCHIATRIC: No depression, anxiety, mood swings, or difficulty sleeping  HEME/LYMPH: No easy bruising, or bleeding gums  ALLERGY AND IMMUNOLOGIC: No hives or eczema      MEDICATIONS  (STANDING):  acetaminophen   Tablet .. 650 milliGRAM(s) Oral every 6 hours  ascorbic acid 500 milliGRAM(s) Oral two times a day  ceFAZolin   IVPB 2000 milliGRAM(s) IV Intermittent every 8 hours  docusate sodium 100 milliGRAM(s) Oral three times a day  DULoxetine 30 milliGRAM(s) Oral daily  lactated ringers. 1000 milliLiter(s) (100 mL/Hr) IV Continuous <Continuous>  lamoTRIgine 200 milliGRAM(s) Oral at bedtime  morphine  - Injectable 4 milliGRAM(s) IV Push once  multivitamin 1 Tablet(s) Oral daily  pantoprazole    Tablet 40 milliGRAM(s) Oral before breakfast  polyethylene glycol 3350 17 Gram(s) Oral daily  sodium chloride 0.9% lock flush 3 milliLiter(s) IV Push every 8 hours    MEDICATIONS  (PRN):  aluminum hydroxide/magnesium hydroxide/simethicone Suspension 30 milliLiter(s) Oral four times a day PRN Indigestion  LORazepam     Tablet 1 milliGRAM(s) Oral two times a day PRN Anxiety  magnesium hydroxide Suspension 30 milliLiter(s) Oral daily PRN Constipation  ondansetron Injectable 4 milliGRAM(s) IV Push every 6 hours PRN Nausea and/or Vomiting  oxyCODONE    IR 5 milliGRAM(s) Oral every 4 hours PRN Mild Pain (1 - 3)  oxyCODONE    IR 10 milliGRAM(s) Oral every 4 hours PRN Moderate Pain (4 - 6)  senna 2 Tablet(s) Oral at bedtime PRN Constipation      Allergies    Grass , Trees &amp; Weeds (Hives)  No Known Drug Allergies    Intolerances        SOCIAL HISTORY:    FAMILY HISTORY:      Vital Signs Last 24 Hrs  T(C): 36 (21 Nov 2018 14:00), Max: 36.9 (21 Nov 2018 09:00)  T(F): 96.8 (21 Nov 2018 14:00), Max: 98.4 (21 Nov 2018 09:00)  HR: 80 (21 Nov 2018 14:50) (73 - 86)  BP: 130/78 (21 Nov 2018 14:50) (112/58 - 163/69)  BP(mean): --  RR: 16 (21 Nov 2018 14:50) (16 - 20)  SpO2: 95% (21 Nov 2018 14:50) (92% - 100%)    PHYSICAL EXAM:    GENERAL: NAD, well-groomed, well-developed  HEAD:  Atraumatic, Normocephalic  EYES: EOMI, PERRLA, conjunctiva and sclera clear  ENMT: No tonsillar erythema, exudates, or enlargement; Moist mucous membranes, Good dentition, No lesions  NECK: Supple, No JVD, Normal thyroid  NERVOUS SYSTEM:  Alert & Oriented X3, Good concentration; Motor Strength 5/5 B/L upper and lower extremities; DTRs 2+ intact and symmetric  CHEST/LUNG: Clear to percussion bilaterally; No rales, rhonchi, wheezing, or rubs  HEART: Regular rate and rhythm; No murmurs, rubs, or gallops  ABDOMEN: Soft, Nontender, Nondistended; Bowel sounds present  EXTREMITIES:  2+ Peripheral Pulses, No clubbing, cyanosis, or edema  LYMPH: No lymphadenopathy noted  SKIN: No rashes or lesions      LABS:                        13.6   10.07 )-----------( 265      ( 21 Nov 2018 10:13 )             43.2                 RADIOLOGY & ADDITIONAL STUDIES:

## 2018-11-21 NOTE — DISCHARGE NOTE ADULT - NS AS ACTIVITY OBS
Stairs allowed/Driving allowed/Do not drive or operate machinery/Walking-Indoors allowed/Walking-Outdoors allowed/No Heavy lifting/straining/Total hip precautions, abduction pillow. Incentive spirometer.

## 2018-11-21 NOTE — OCCUPATIONAL THERAPY INITIAL EVALUATION ADULT - PLANNED THERAPY INTERVENTIONS, OT EVAL
balance training/bed mobility training/ROM/strengthening/stretching/transfer training/ADL retraining

## 2018-11-21 NOTE — CONSULT NOTE ADULT - ASSESSMENT
73F pmh depression, arthritis c/o right hip pain found to have primary osteoarthritis of right hip s/p scheduled Right total hip arthroplasty     Problem/Plan - 1:  ·  Problem: Unilateral primary osteoarthritis, right hip.  Plan: s/p Right total hip arthroplasty  Plan per ortho  PT  pain management, DVT ppx      Problem/Plan - 2:  ·  Problem: Depression.  Plan: Continue current regimen and medications.

## 2018-11-21 NOTE — PHARMACY COMMUNICATION NOTE - COMMENTS
Spoke with PA regarding dose and frequency of cymbalta, PA states that the patient is taking cymbalta three times a day at home.

## 2018-11-21 NOTE — DISCHARGE NOTE ADULT - ADDITIONAL INSTRUCTIONS
Follow up with your surgeon in two weeks. Call for appointment.  If you need more pain medication, call your surgeon's office.  Call your surgeon if you have increased redness/pain/drainage or fever. Return to ER for shortness of breath/calf tenderness.

## 2018-11-21 NOTE — PHYSICAL THERAPY INITIAL EVALUATION ADULT - IMPAIRMENTS CONTRIBUTING TO GAIT DEVIATIONS, PT EVAL
endurance/strength of B UE causing UE shaking/pain/decreased strength/decreased ROM/impaired balance

## 2018-11-21 NOTE — BRIEF OPERATIVE NOTE - PROCEDURE
<<-----Click on this checkbox to enter Procedure Right total hip arthroplasty  11/21/2018    Active  [id number]

## 2018-11-21 NOTE — PHYSICAL THERAPY INITIAL EVALUATION ADULT - ADDITIONAL COMMENTS
As per pre-op: Pt lives in private house c 2 steps, w/o rail, to get into house and there is 13 steps c R rail to negotiate at home. Pt was advised to install rails to get into house safely but pt refused.  Pt has no one to assist in post op care. Pt prefers to go to subacute rehab. Pt has axillary crutches and received Rolling Walker and  3-1commode after last PST on Oct. 18th. Pt reported fell and injured L rotator cuff after last PST and hip surgery was cancelled.

## 2018-11-21 NOTE — DISCHARGE NOTE ADULT - CARE PLAN
Principal Discharge DX:	Primary osteoarthritis of right hip  Goal:	improved function, decreased pain, improved ADL's  Assessment and plan of treatment:	Keep Prineo Dressing Clean, Dry and Intact. May shower with Prineo Dressing. Please do not scrub, soak, peel or pick at the prineo dressing. No creams, lotions, or oils over dressing. May shower and let water run over incision, no baths. Pat dry once out of shower. Dressing to be removed in office at follow up visit in 2 weeks. Principal Discharge DX:	Primary osteoarthritis of right hip  Goal:	Improve Function, Decrease Pain  Assessment and plan of treatment:	Keep Prineo Dressing Clean, Dry and Intact. May shower with Prineo Dressing. Please do not scrub, soak, peel or pick at the prineo dressing. No creams, lotions, or oils over dressing. May shower and let water run over incision, no baths. Pat dry once out of shower. Dressing to be removed in office at follow up visit in 2 weeks.

## 2018-11-21 NOTE — DISCHARGE NOTE ADULT - CARE PROVIDER_API CALL
Lukas Claros), Orthopaedic Surgery  [address] [place] [address]  Phone: [phone]  Fax: [phone]

## 2018-11-21 NOTE — DISCHARGE NOTE ADULT - PATIENT PORTAL LINK FT
You can access the [facility] Patient Portal, offered by [facility], by registering with the following website: [email or web address]

## 2018-11-21 NOTE — OCCUPATIONAL THERAPY INITIAL EVALUATION ADULT - NS ASR OT EQUIP NEEDS DISCH
bedside commode/rolling walker (5 inch wheels)/patient reports she has DME. patient provided hip kit at bedside.

## 2018-11-21 NOTE — PHYSICAL THERAPY INITIAL EVALUATION ADULT - ACTIVE RANGE OF MOTION EXAMINATION, REHAB EVAL
Left LE Active ROM was WFL (within functional limits)/Right UE Active ROM was WFL (within functional limits)/L shoulder to 90 degrees flexion,

## 2018-11-21 NOTE — PHYSICAL THERAPY INITIAL EVALUATION ADULT - CRITERIA FOR SKILLED THERAPEUTIC INTERVENTIONS
anticipated discharge recommendation/functional limitations in following categories/rehab potential/predicted duration of therapy intervention/risk reduction/prevention/impairments found/therapy frequency

## 2018-11-21 NOTE — DISCHARGE NOTE ADULT - PLAN OF CARE
improved function, decreased pain, improved ADL's Keep Prineo Dressing Clean, Dry and Intact. May shower with Prineo Dressing. Please do not scrub, soak, peel or pick at the prineo dressing. No creams, lotions, or oils over dressing. May shower and let water run over incision, no baths. Pat dry once out of shower. Dressing to be removed in office at follow up visit in 2 weeks. Improve Function, Decrease Pain

## 2018-11-22 VITALS
DIASTOLIC BLOOD PRESSURE: 73 MMHG | SYSTOLIC BLOOD PRESSURE: 130 MMHG | HEART RATE: 77 BPM | TEMPERATURE: 99 F | RESPIRATION RATE: 17 BRPM | OXYGEN SATURATION: 98 %

## 2018-11-22 LAB
ANION GAP SERPL CALC-SCNC: 7 MMOL/L — SIGNIFICANT CHANGE UP (ref 5–17)
BUN SERPL-MCNC: 14 MG/DL — SIGNIFICANT CHANGE UP (ref 7–23)
CALCIUM SERPL-MCNC: 8.4 MG/DL — LOW (ref 8.5–10.1)
CHLORIDE SERPL-SCNC: 107 MMOL/L — SIGNIFICANT CHANGE UP (ref 96–108)
CO2 SERPL-SCNC: 28 MMOL/L — SIGNIFICANT CHANGE UP (ref 22–31)
CREAT SERPL-MCNC: 0.86 MG/DL — SIGNIFICANT CHANGE UP (ref 0.5–1.3)
GLUCOSE SERPL-MCNC: 120 MG/DL — HIGH (ref 70–99)
HCT VFR BLD CALC: 37.8 % — SIGNIFICANT CHANGE UP (ref 34.5–45)
HGB BLD-MCNC: 11.9 G/DL — SIGNIFICANT CHANGE UP (ref 11.5–15.5)
MCHC RBC-ENTMCNC: 27.2 PG — SIGNIFICANT CHANGE UP (ref 27–34)
MCHC RBC-ENTMCNC: 31.5 GM/DL — LOW (ref 32–36)
MCV RBC AUTO: 86.5 FL — SIGNIFICANT CHANGE UP (ref 80–100)
NRBC # BLD: 0 /100 WBCS — SIGNIFICANT CHANGE UP (ref 0–0)
PLATELET # BLD AUTO: 265 K/UL — SIGNIFICANT CHANGE UP (ref 150–400)
POTASSIUM SERPL-MCNC: 4.5 MMOL/L — SIGNIFICANT CHANGE UP (ref 3.5–5.3)
POTASSIUM SERPL-SCNC: 4.5 MMOL/L — SIGNIFICANT CHANGE UP (ref 3.5–5.3)
RBC # BLD: 4.37 M/UL — SIGNIFICANT CHANGE UP (ref 3.8–5.2)
RBC # FLD: 14 % — SIGNIFICANT CHANGE UP (ref 10.3–14.5)
SODIUM SERPL-SCNC: 142 MMOL/L — SIGNIFICANT CHANGE UP (ref 135–145)
WBC # BLD: 10.72 K/UL — HIGH (ref 3.8–10.5)
WBC # FLD AUTO: 10.72 K/UL — HIGH (ref 3.8–10.5)

## 2018-11-22 RX ORDER — OXYCODONE HYDROCHLORIDE 5 MG/1
1 TABLET ORAL
Qty: 0 | Refills: 0 | DISCHARGE
Start: 2018-11-22

## 2018-11-22 RX ORDER — DOCUSATE SODIUM 100 MG
1 CAPSULE ORAL
Qty: 0 | Refills: 0 | DISCHARGE
Start: 2018-11-22

## 2018-11-22 RX ORDER — ASCORBIC ACID 60 MG
1 TABLET,CHEWABLE ORAL
Qty: 0 | Refills: 0 | DISCHARGE
Start: 2018-11-22

## 2018-11-22 RX ORDER — ASPIRIN/CALCIUM CARB/MAGNESIUM 324 MG
1 TABLET ORAL
Qty: 0 | Refills: 0 | DISCHARGE
Start: 2018-11-22

## 2018-11-22 RX ORDER — PANTOPRAZOLE SODIUM 20 MG/1
1 TABLET, DELAYED RELEASE ORAL
Qty: 0 | Refills: 0 | DISCHARGE
Start: 2018-11-22

## 2018-11-22 RX ORDER — SUMATRIPTAN SUCCINATE 4 MG/.5ML
100 INJECTION, SOLUTION SUBCUTANEOUS DAILY
Qty: 0 | Refills: 0 | Status: DISCONTINUED | OUTPATIENT
Start: 2018-11-22 | End: 2018-11-22

## 2018-11-22 RX ORDER — SUMATRIPTAN SUCCINATE 4 MG/.5ML
1 INJECTION, SOLUTION SUBCUTANEOUS
Qty: 0 | Refills: 0 | DISCHARGE
Start: 2018-11-22

## 2018-11-22 RX ORDER — OXYCODONE HYDROCHLORIDE 5 MG/1
1 TABLET ORAL
Qty: 0 | Refills: 0 | COMMUNITY

## 2018-11-22 RX ORDER — CELECOXIB 200 MG/1
1 CAPSULE ORAL
Qty: 0 | Refills: 0 | DISCHARGE
Start: 2018-11-22

## 2018-11-22 RX ADMIN — OXYCODONE HYDROCHLORIDE 10 MILLIGRAM(S): 5 TABLET ORAL at 05:47

## 2018-11-22 RX ADMIN — Medication 1 TABLET(S): at 11:38

## 2018-11-22 RX ADMIN — Medication 500 MILLIGRAM(S): at 05:47

## 2018-11-22 RX ADMIN — SODIUM CHLORIDE 3 MILLILITER(S): 9 INJECTION INTRAMUSCULAR; INTRAVENOUS; SUBCUTANEOUS at 05:43

## 2018-11-22 RX ADMIN — OXYCODONE HYDROCHLORIDE 10 MILLIGRAM(S): 5 TABLET ORAL at 14:45

## 2018-11-22 RX ADMIN — DULOXETINE HYDROCHLORIDE 30 MILLIGRAM(S): 30 CAPSULE, DELAYED RELEASE ORAL at 05:47

## 2018-11-22 RX ADMIN — DULOXETINE HYDROCHLORIDE 30 MILLIGRAM(S): 30 CAPSULE, DELAYED RELEASE ORAL at 13:47

## 2018-11-22 RX ADMIN — OXYCODONE HYDROCHLORIDE 10 MILLIGRAM(S): 5 TABLET ORAL at 06:47

## 2018-11-22 RX ADMIN — Medication 325 MILLIGRAM(S): at 05:47

## 2018-11-22 RX ADMIN — CELECOXIB 200 MILLIGRAM(S): 200 CAPSULE ORAL at 06:48

## 2018-11-22 RX ADMIN — LAMOTRIGINE 100 MILLIGRAM(S): 25 TABLET, ORALLY DISINTEGRATING ORAL at 05:48

## 2018-11-22 RX ADMIN — Medication 650 MILLIGRAM(S): at 00:27

## 2018-11-22 RX ADMIN — PANTOPRAZOLE SODIUM 40 MILLIGRAM(S): 20 TABLET, DELAYED RELEASE ORAL at 07:44

## 2018-11-22 RX ADMIN — CELECOXIB 200 MILLIGRAM(S): 200 CAPSULE ORAL at 05:48

## 2018-11-22 RX ADMIN — SODIUM CHLORIDE 3 MILLILITER(S): 9 INJECTION INTRAMUSCULAR; INTRAVENOUS; SUBCUTANEOUS at 13:46

## 2018-11-22 RX ADMIN — SUMATRIPTAN SUCCINATE 100 MILLIGRAM(S): 4 INJECTION, SOLUTION SUBCUTANEOUS at 06:56

## 2018-11-22 RX ADMIN — OXYCODONE HYDROCHLORIDE 10 MILLIGRAM(S): 5 TABLET ORAL at 13:47

## 2018-11-22 RX ADMIN — SUMATRIPTAN SUCCINATE 100 MILLIGRAM(S): 4 INJECTION, SOLUTION SUBCUTANEOUS at 07:57

## 2018-11-22 RX ADMIN — Medication 1 MILLIGRAM(S): at 11:38

## 2018-11-22 RX ADMIN — Medication 102 MILLIGRAM(S): at 05:47

## 2018-11-22 NOTE — PROGRESS NOTE ADULT - SUBJECTIVE AND OBJECTIVE BOX
POD#1 S/P Right HARISH   73yFemale Patient seen and examined, Pain controlled  Patient Denies SOB, CP, N/V/D       PE: Right Hip/LE: Dressing C/D/I, Sensation/motor intact, DP 2+, FROM ankle/toes   B/L LE: Skin intact. +ROM hip/knee/ankle/toes. Ankle Dorsi/plantarflexion: 5/5. Calf: soft, compressible and nontender. DP/PT 2+ NVI                          11.9   10.72 )-----------( 265      ( 22 Nov 2018 06:12 )             37.8       11-22    142  |  107  |  14  ----------------------------<  120<H>  4.5   |  28  |  0.86    Ca    8.4<L>      22 Nov 2018 06:12          A: As above   P: Pain Control       DVT Prophylaxis      Incentive spirometry      Total hip precautions Reviewed       PT WBAT RLE      Isometric exercises      Discharge Planning      All the above discussed and understood by pt       Ortho to F/U
Patient is seen and examined at bedside. Denies CP/SOB/Dizziness/N/V/D/HA. Pain is controlled.     Vital Signs Last 24 Hrs  T(C): 35.6 (21 Nov 2018 13:00), Max: 36.9 (21 Nov 2018 09:00)  T(F): 96 (21 Nov 2018 13:00), Max: 98.4 (21 Nov 2018 09:00)  HR: 84 (21 Nov 2018 13:00) (73 - 86)  BP: 119/63 (21 Nov 2018 13:00) (115/59 - 163/69)  BP(mean): --  RR: 16 (21 Nov 2018 13:00) (16 - 20)  SpO2: 94% (21 Nov 2018 13:00) (94% - 100%)    GEN: NAD  ABD: soft, NT/ND; no rebound or guarding;  Neurologic: AAOx3; CNS grossly intact; no focal deficits  right hip: Prineo Dressing C/D/I. abduction pillow in place  B/L LE's: Motor intact + EHL/FHL/TA/GS in the BL LE. Sensation is grossly intact B/L. Extremities warm B/L. Compartments soft, compressible B/L, no calf tenderness B/L. DP 2+ B/L.    Labs:                          13.6   10.07 )-----------( 265      ( 21 Nov 2018 10:13 )             43.2               A/P: Patient is a 73y y/o Female s/p right total hip arthroplasty POD # 0  -wound care, isometric exercises, GI motility, new medications, hip precautions,  hospital course and discharge planning reviewed with pt  -Pain control/analgesia  -Inc spirometry reviewed and counseled  -Venodynes/foot pumps  -F/U AM Labs  -PT/OT/WBAT   -Antibiotic per scips  -Anticoagulation: ASA 325mg BID  -Pt wants rehab - will determine DC plan pending PT evaluation

## 2018-11-27 LAB — SURGICAL PATHOLOGY STUDY: SIGNIFICANT CHANGE UP

## 2018-11-29 DIAGNOSIS — Z85.828 PERSONAL HISTORY OF OTHER MALIGNANT NEOPLASM OF SKIN: ICD-10-CM

## 2018-11-29 DIAGNOSIS — M16.11 UNILATERAL PRIMARY OSTEOARTHRITIS, RIGHT HIP: ICD-10-CM

## 2018-11-29 DIAGNOSIS — F32.9 MAJOR DEPRESSIVE DISORDER, SINGLE EPISODE, UNSPECIFIED: ICD-10-CM

## 2019-03-08 ENCOUNTER — TRANSCRIPTION ENCOUNTER (OUTPATIENT)
Age: 74
End: 2019-03-08

## 2020-04-06 NOTE — OCCUPATIONAL THERAPY INITIAL EVALUATION ADULT - MD ORDER
April 2014    HTN (hypertension)     Hypertension       Allergies: Allergies   Allergen Reactions    Other Hives    Ceclor [Cefaclor]     Food      All melons      Sulfa Antibiotics      Projectile vomiting       Medications :  [MAR Hold] cetirizine, 10 mg, Oral, Daily  [MAR Hold] docusate sodium, 100 mg, Oral, BID  [MAR Hold] lisinopril-hydroCHLOROthiazide, 1 tablet, Oral, Daily  [MAR Hold] metoprolol succinate, 100 mg, Oral, Daily  [MAR Hold] sodium chloride flush, 10 mL, Intravenous, 2 times per day  [MAR Hold] famotidine (PEPCID) injection, 20 mg, Intravenous, BID  [MAR Hold] ampicillin-sulbactam, 3 g, Intravenous, Q6H        Review of Systems   Review of Systems   Constitutional: Negative for appetite change, fatigue, fever and unexpected weight change. HENT: Negative for congestion, rhinorrhea and sneezing. Eyes: Negative for visual disturbance. Respiratory: Negative for cough, chest tightness, shortness of breath and wheezing. Cardiovascular: Negative for chest pain and palpitations. Gastrointestinal: Positive for abdominal pain. Negative for blood in stool, constipation, diarrhea, nausea and vomiting. Genitourinary: Negative for dysuria, enuresis, frequency and hematuria. Musculoskeletal: Negative for arthralgias and myalgias. Skin: Negative for rash. Neurological: Negative for dizziness, weakness, light-headedness and headaches. Hematological: Does not bruise/bleed easily. Psychiatric/Behavioral: Negative for dysphoric mood and sleep disturbance.      Objective :      Current Vitals : Temp: 98.2 °F (36.8 °C),  Pulse: 63, Resp: 18, BP: 133/70, SpO2: 95 %  Last 24 Hrs Vitals   Patient Vitals for the past 24 hrs:   BP Temp Temp src Pulse Resp SpO2 Weight   04/06/20 0640 133/70 98.2 °F (36.8 °C) Oral 63 18 95 % --   04/06/20 0534 -- -- -- -- -- -- 240 lb 4.8 oz (109 kg)   04/05/20 1919 130/65 97.3 °F (36.3 °C) Oral 61 18 98 % --     Intake / output   04/05 0701 - 04/06 0700  In: 1566.6 [I.V.:1166.6]  Out: 2100 [Urine:2100]  Physical Exam:  Physical Exam  Vitals signs and nursing note reviewed. Constitutional:       General: She is not in acute distress. Appearance: She is not diaphoretic. HENT:      Head: Normocephalic and atraumatic. Nose:      Right Sinus: No maxillary sinus tenderness or frontal sinus tenderness. Left Sinus: No maxillary sinus tenderness or frontal sinus tenderness. Mouth/Throat:      Pharynx: No oropharyngeal exudate. Eyes:      General: No scleral icterus. Conjunctiva/sclera: Conjunctivae normal.      Pupils: Pupils are equal, round, and reactive to light. Neck:      Musculoskeletal: Full passive range of motion without pain and neck supple. Thyroid: No thyromegaly. Vascular: No JVD. Cardiovascular:      Rate and Rhythm: Normal rate and regular rhythm. Pulses:           Dorsalis pedis pulses are 2+ on the right side and 2+ on the left side. Heart sounds: Normal heart sounds. No murmur. Pulmonary:      Effort: Pulmonary effort is normal.      Breath sounds: Normal breath sounds. No wheezing or rales. Abdominal:      Palpations: Abdomen is soft. There is no mass. Tenderness: There is abdominal tenderness in the right upper quadrant and epigastric area. Comments: Lap surgical wound    Lymphadenopathy:      Head:      Right side of head: No submandibular adenopathy. Left side of head: No submandibular adenopathy. Cervical: No cervical adenopathy. Skin:     General: Skin is warm. Neurological:      Mental Status: She is alert and oriented to person, place, and time. Motor: No tremor. Psychiatric:         Behavior: Behavior is cooperative.            Laboratory findings:    Recent Labs     04/04/20  0648 04/04/20  0827 04/05/20  0631 04/06/20  0530   WBC  --  10.2 7.2 6.9   HGB  --  14.5 13.2 13.6   HCT  --  43.9 40.6 42.4   PLT  --  207 186 156   INR 1.0  --   --   --      Recent Labs Occupational Therapy Evaluate and Treat

## 2020-07-10 ENCOUNTER — TRANSCRIPTION ENCOUNTER (OUTPATIENT)
Age: 75
End: 2020-07-10

## 2021-07-07 ENCOUNTER — TRANSCRIPTION ENCOUNTER (OUTPATIENT)
Age: 76
End: 2021-07-07

## 2022-04-05 NOTE — OCCUPATIONAL THERAPY INITIAL EVALUATION ADULT - LOWER BODY DRESSING, PREVIOUS LEVEL OF FUNCTION, OT EVAL
bilateral upper extremity ROM was WFL (within functional limits)/bilateral lower extremity ROM was WFL (within functional limits)
independent

## 2022-04-11 ENCOUNTER — APPOINTMENT (OUTPATIENT)
Dept: ORTHOPEDIC SURGERY | Facility: CLINIC | Age: 77
End: 2022-04-11

## 2022-04-11 ENCOUNTER — APPOINTMENT (OUTPATIENT)
Dept: ORTHOPEDIC SURGERY | Facility: CLINIC | Age: 77
End: 2022-04-11
Payer: MEDICARE

## 2022-04-11 VITALS — BODY MASS INDEX: 21.49 KG/M2 | HEIGHT: 65 IN | WEIGHT: 129 LBS

## 2022-04-11 PROCEDURE — 99213 OFFICE O/P EST LOW 20 MIN: CPT | Mod: 25

## 2022-04-11 PROCEDURE — 20610 DRAIN/INJ JOINT/BURSA W/O US: CPT

## 2022-04-11 NOTE — PROCEDURE
[Large Joint Injection] : Large joint injection [Right] : of the right [Knee] : knee [Pain] : pain [Inflammation] : inflammation [X-ray evidence of Osteoarthritis on this or prior visit] : x-ray evidence of Osteoarthritis on this or prior visit [Betadine] : betadine [Ethyl Chloride sprayed topically] : ethyl chloride sprayed topically [Sterile technique used] : sterile technique used [___ cc    1%] : Lidocaine ~Vcc of 1%  [___ cc    32 units 5mg] : Zilretta ~Vcc of 32 units 5 mg  [Call if redness, pain or fever occur] : call if redness, pain or fever occur [Apply ice for 15min out of every hour for the next 12-24 hours as tolerated] : apply ice for 15 minutes out of every hour for the next 12-24 hours as tolerated [Previous OTC use and PT nontherapeutic] : patient has tried OTC's including aspirin, Ibuprofen, Aleve, etc or prescription NSAIDS, and/or exercises at home and/or physical therapy without satisfactory response [Patient had decreased mobility in the joint] : patient had decreased mobility in the joint [Risks, benefits, alternatives discussed / Verbal consent obtained] : the risks benefits, and alternatives have been discussed, and verbal consent was obtained

## 2022-04-11 NOTE — ASSESSMENT
[FreeTextEntry1] : Previous doc:\par X-rays reveal right inferior superior rami fracture and components well fixed in good position. Recommended take 1 81mg of asprin 2x day. to prevent DVT - No restrictions. Activity as tolerated.\par 8/21/20: New Xrays reveal routine healing with callus present, Reports improvement but still with sig pain, PT just starting Ptherapy, Prescribed tramadol for pain relief.\par 6/28/21: Right hip doing well; Continued pain in the right knee - Has had cortisone injection in the past with some relief and would like HA injections today\par 7/12/21: Inj tolerated well. Asp 5cc.\par 7/20/21: Inj tolerated well. Asp 5cc.\par 8/23/21: No relief from orthovisc - cortisone inj today tolerated well.\par 11/8/21: short term relief from CSI in august. Indicated for Zilretta today to hopefully give her longer relief. Tolerated well. f/up in 3 months.\par 3/8/22: She has sig swelling in the soft tissue and tenderness a- adv OA but no change and no acute fracture - recommend Ice rest and nsaids as needed - if cont pain in a month will try injection\par \par 4/11/22: Pain returned - zilretta right knee tolerated well.  Asp 10cc clear yellow fluid.

## 2022-04-11 NOTE — HISTORY OF PRESENT ILLNESS
[8] : 8 [Burning] : burning [Diffuse] : diffuse [Dull/Aching] : dull/aching [Radiating] : radiating [Sharp] : sharp [Constant] : constant [Household chores] : household chores [Leisure] : leisure [Sleep] : sleep [Meds] : meds [Standing] : standing [Walking] : walking [Stairs] : stairs [de-identified] : 4/11/22: RIght knee pain worsening recently, zilretta last time helped a lot.\par \par Previous doc:\par 7/21/20: Ms. Gertrudis Jay, a 74-year-old female, presents today for right hip and pelvis pain after tripping over her dog on 7/4/20. Known to me for right HARISH in 2018. 1 week s/p ORIF of her right wrist by \par 8/21/20: Pt present for f/u of Closed fx of ramus of right pubis, Pt reports some improvement and more ROM, Only started Ptherapy today.\par 6/28/21: Continued pain in the right knee - Here for HA injections\par 7/12/21: Orthovisc #3 right knee.\par 7/20/21: Orthovisc #4 right knee, no change.\par 08/23/21: s/p completing series of orthovisc injections, feels that she has not yet obtained any relief. Feels that her right knee frequently will buckle , mostly after getting up from any sustained sitting. Overall pain is the same that it had been, now with nocturnal awakening.\par 11/8/21: f/up for R knee. She states the pain returned over the past few weeks with no new injury.\par 3/8/22: 77yo F, known to Dr. Claros, with R knee pain after a fall down stairs on 3/4/22. She was initially seen at the ER and placed in a brace. she has pain in the knee but better than from when she fell but sig pain even with standing  [] : no [FreeTextEntry1] : right knee/leg

## 2022-04-11 NOTE — PHYSICAL EXAM
[Right] : right knee [] : ligamentously stable [TWNoteComboBox7] : flexion 125 degrees [de-identified] : extension 0 degrees

## 2022-04-24 ENCOUNTER — EMERGENCY (EMERGENCY)
Facility: HOSPITAL | Age: 77
LOS: 1 days | Discharge: HOME | End: 2022-04-24
Attending: EMERGENCY MEDICINE | Admitting: EMERGENCY MEDICINE
Payer: MEDICARE

## 2022-04-24 VITALS
DIASTOLIC BLOOD PRESSURE: 86 MMHG | SYSTOLIC BLOOD PRESSURE: 157 MMHG | HEART RATE: 72 BPM | HEIGHT: 66 IN | OXYGEN SATURATION: 98 % | WEIGHT: 111.99 LBS | TEMPERATURE: 99 F | RESPIRATION RATE: 21 BRPM

## 2022-04-24 DIAGNOSIS — S00.93XA CONTUSION OF UNSPECIFIED PART OF HEAD, INITIAL ENCOUNTER: ICD-10-CM

## 2022-04-24 DIAGNOSIS — M54.9 DORSALGIA, UNSPECIFIED: ICD-10-CM

## 2022-04-24 DIAGNOSIS — Z98.890 OTHER SPECIFIED POSTPROCEDURAL STATES: Chronic | ICD-10-CM

## 2022-04-24 DIAGNOSIS — C44.300 UNSPECIFIED MALIGNANT NEOPLASM OF SKIN OF UNSPECIFIED PART OF FACE: ICD-10-CM

## 2022-04-24 DIAGNOSIS — S62.317A DISPLACED FRACTURE OF BASE OF FIFTH METACARPAL BONE, LEFT HAND, INITIAL ENCOUNTER FOR CLOSED FRACTURE: ICD-10-CM

## 2022-04-24 DIAGNOSIS — F32.A DEPRESSION, UNSPECIFIED: ICD-10-CM
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
## 2022-04-24 DIAGNOSIS — M25.512 PAIN IN LEFT SHOULDER: ICD-10-CM
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
## 2022-04-24 DIAGNOSIS — F41.9 ANXIETY DISORDER, UNSPECIFIED: ICD-10-CM

## 2022-04-24 DIAGNOSIS — R51.9 HEADACHE, UNSPECIFIED: ICD-10-CM

## 2022-04-24 DIAGNOSIS — R11.0 NAUSEA: ICD-10-CM

## 2022-04-24 DIAGNOSIS — Z79.82 LONG TERM (CURRENT) USE OF ASPIRIN: ICD-10-CM
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
## 2022-04-24 DIAGNOSIS — Z86.19 PERSONAL HISTORY OF OTHER INFECTIOUS AND PARASITIC DISEASES: ICD-10-CM

## 2022-04-24 DIAGNOSIS — Z91.048 OTHER NONMEDICINAL SUBSTANCE ALLERGY STATUS: ICD-10-CM
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
## 2022-04-24 DIAGNOSIS — R07.81 PLEURODYNIA: ICD-10-CM

## 2022-04-24 PROCEDURE — 29125 APPL SHORT ARM SPLINT STATIC: CPT | Mod: LT

## 2022-04-24 PROCEDURE — 70480 CT ORBIT/EAR/FOSSA W/O DYE: CPT | Mod: 26,59,MG

## 2022-04-24 PROCEDURE — 71045 X-RAY EXAM CHEST 1 VIEW: CPT | Mod: 26

## 2022-04-24 PROCEDURE — 99284 EMERGENCY DEPT VISIT MOD MDM: CPT | Mod: FS,57

## 2022-04-24 PROCEDURE — 73130 X-RAY EXAM OF HAND: CPT | Mod: 26

## 2022-04-24 PROCEDURE — 73030 X-RAY EXAM OF SHOULDER: CPT | Mod: 26

## 2022-04-24 PROCEDURE — 93005 ELECTROCARDIOGRAM TRACING: CPT | Mod: XU

## 2022-04-24 PROCEDURE — 72125 CT NECK SPINE W/O DYE: CPT | Mod: 26,MG

## 2022-04-24 PROCEDURE — G1004: CPT

## 2022-04-24 PROCEDURE — 70450 CT HEAD/BRAIN W/O DYE: CPT | Mod: MG

## 2022-04-24 PROCEDURE — 70450 CT HEAD/BRAIN W/O DYE: CPT | Mod: 26,MG

## 2022-04-24 PROCEDURE — 93010 ELECTROCARDIOGRAM REPORT: CPT | Mod: 59

## 2022-04-24 PROCEDURE — 71045 X-RAY EXAM CHEST 1 VIEW: CPT

## 2022-04-24 PROCEDURE — 70480 CT ORBIT/EAR/FOSSA W/O DYE: CPT | Mod: MG

## 2022-04-24 PROCEDURE — 73030 X-RAY EXAM OF SHOULDER: CPT

## 2022-04-24 PROCEDURE — 26600 TREAT METACARPAL FRACTURE: CPT | Mod: 54

## 2022-04-24 PROCEDURE — 73130 X-RAY EXAM OF HAND: CPT

## 2022-04-24 PROCEDURE — 99284 EMERGENCY DEPT VISIT MOD MDM: CPT | Mod: 25

## 2022-04-24 PROCEDURE — 72125 CT NECK SPINE W/O DYE: CPT | Mod: MG

## 2022-04-24 PROCEDURE — 73130 X-RAY EXAM OF HAND: CPT | Mod: 26,LT

## 2022-04-24 RX ORDER — ONDANSETRON 8 MG/1
4 TABLET, FILM COATED ORAL ONCE
Refills: 0 | Status: COMPLETED | OUTPATIENT
Start: 2022-04-24 | End: 2022-04-24

## 2022-04-24 RX ORDER — IBUPROFEN 200 MG
400 TABLET ORAL ONCE
Refills: 0 | Status: COMPLETED | OUTPATIENT
Start: 2022-04-24 | End: 2022-04-24

## 2022-04-24 RX ORDER — ACETAMINOPHEN 500 MG
650 TABLET ORAL ONCE
Refills: 0 | Status: COMPLETED | OUTPATIENT
Start: 2022-04-24 | End: 2022-04-24

## 2022-04-24 RX ADMIN — Medication 650 MILLIGRAM(S): at 21:27

## 2022-04-24 RX ADMIN — Medication 400 MILLIGRAM(S): at 23:50

## 2022-04-24 RX ADMIN — Medication 400 MILLIGRAM(S): at 23:51

## 2022-04-24 RX ADMIN — Medication 650 MILLIGRAM(S): at 23:51

## 2022-04-24 RX ADMIN — ONDANSETRON 4 MILLIGRAM(S): 8 TABLET, FILM COATED ORAL at 21:28

## 2022-04-24 NOTE — ED PROVIDER NOTE - PATIENT PORTAL LINK FT
You can access the FollowMyHealth Patient Portal offered by [facility] by registering at the following website: [email or web address]. By joining [facility]’s FollowMyHealth portal, you will also be able to view your health information using other applications (apps) compatible with our system.

## 2022-04-24 NOTE — ED PROVIDER NOTE - CLINICAL SUMMARY MEDICAL DECISION MAKING FREE TEXT BOX
Likely a mechanical fall; sustained trauma to left side of head/face; left shoulder; left hand; left ribs.  No prodrome or other symptoms to suggest syncope.  No blood thinners.  Plan: imaging of head/orbits, c-spine, CXR, left shoulder, left hand.  No wrist pain and no snuffbox/wrist tenderness.  No open wounds.

## 2022-04-24 NOTE — ED PROVIDER NOTE - PROGRESS NOTE DETAILS
XR showing suspected acute fx base left 5th metacarpal.  Old fx 3rd metacarpal.  Pt has a hand surgeon in [place].

## 2022-04-24 NOTE — ED PROVIDER NOTE - CARE PLAN
Principal Discharge DX:	Fall  Secondary Diagnosis:	Closed fracture of metacarpal bone, initial encounter   1

## 2022-04-24 NOTE — ED ADULT TRIAGE NOTE - ARRIVAL INFO ADDITIONAL COMMENTS
Patient reports mechanical trip an fall. Sustained pain to the left side of head and secondary pain to the left wrist. Patient denies syncope, near syncope, pre- and post- fall event. Patient denies blood thinner and ASA use. Patient able to stand and ambulate post fall event.

## 2022-04-24 NOTE — ED ADULT NURSE NOTE - NSIMPLEMENTINTERV_GEN_ALL_ED
Implemented All Fall Risk Interventions:  [employer] to call system. Call bell, personal items and telephone within reach. Instruct patient to call for assistance. Room bathroom lighting operational. Non-slip footwear when patient is off stretcher. Physically safe environment: no spills, clutter or unnecessary equipment. Stretcher in lowest position, wheels locked, appropriate side rails in place. Provide visual cue, wrist band, yellow gown, etc. Monitor gait and stability. Monitor for mental status changes and reorient to person, place, and time. Review medications for side effects contributing to fall risk. Reinforce activity limits and safety measures with patient and family.

## 2022-04-24 NOTE — ED PROVIDER NOTE - MUSCULOSKELETAL [+], MLM
Emerson Magana  MR#: [id number]  : 1950  ACCOUNT #: [de-identified]   DATE OF SERVICE: 2018    REASON FOR CONSULTATION:  Chronic kidney disease stage V with hyperkalemia. HISTORY OF PRESENT ILLNESS:  This patient is a 73-year-old female with history of chronic kidney disease stage V, followed by Dr. Norman Genao at our office, underwent yesterday right  endarterectomy. We are consulted today because of the hyperkalemia at 7.5. The potassium was checked twice, it is still high at first 7.1, rechecked again 7.5, apparently not hemolyzed. Patient has chronic kidney disease stage V with a single functioning kidney. Right kidney is atrophic and the left kidney has issue with obstruction with stent placement in the past.  Her creatinine in the past has been high. The last creatinine from our office was 4.16, that was in 2018. The last time was seen in our office at that time. Her potassium looking back was high also in the high 5's, 5.8 a few months ago. I am not sure when the patient has exchange of her stent. Patient is a poor historian. It is difficult to see if there are any EKG changes, but may be elevated peak of T-wave, but otherwise not significant change. REVIEW OF SYSTEMS:  The patient is doing well beside the potassium. Blood pressure okay. Pulse is up. No major complication. No stroke. The patient stated that she is making urine with no difficulty. PAST MEDICAL HISTORY:    1. History of chronic kidney disease stage V, I explained before with obstruction of the left kidney, status post exchange of the stent, right kidney atrophic.  2.  Bilateral carotid artery stenosis  3. Hypercholesterolemia. 4.  CVA. 5.  Major depression. 6.  Anemia  7. Hypertension. 8.  Coronary artery disease. 9.  Tobacco abuse. 10.  GERD.     PAST SURGICAL HISTORY:  Stent placement in the left kidney multiple times.    SOCIAL HISTORY:  The patient is a smoker for at least 50 years, half pack a day. CURRENT MEDICATIONS:  Norvasc 10 mg a day, aspirin 81 mg a day, losartan 100 mg a day, that needs to be discontinued, Toprol-XL 50 mg a day. She is on IV fluid with normal saline with D5 at 50 mL/hour and lactated Ringer's at 75 mL/hour. PHYSICAL EXAMINATION:  VITAL SIGNS:  Temperature is 97.5, heart rate 68, blood pressure 144/65, oxygen saturation 99% on room air. GENERAL:  The patient is awake, not in acute respiratory distress. NECK:  No lymph nodes, no thyromegaly. LUNGS:  There is poor thoracic expansion with decreased breath sound. HEART:  Regular rhythm. Systolic murmur 2/6 left sternal border. No rub. ABDOMEN:  Soft, nontender, no organomegaly, no mass. EXTREMITIES:  There is no edema. Patient has strength 5/5 in all extremities. IMPRESSION:  1. Chronic kidney disease stage V with hyperkalemia, acidosis. Patient has a single L. functioning kidney with hydronephrosis, status post multiple stents exchanged and Right kidney atrophic. 2.  Hypertension. 3.  Bilateral carotid artery stenosis status post endarterectomy day 1.  4.  Coronary artery disease, history of cerebrovascular accident. RECOMMENDATION:  Will stop the losartan, change the IV fluid with sodium bicarbonate, albuterol inhaler, Veltassa  and insulin IV with D5 water. Will check closely the blood sugar to avoid hypoglycemia and will repeat the potassium again in a few hours. I will check the renal ultrasound to be sure that patient does not have any hydronephrosis at the time, but it is unlikely because her creatinine has been stable since 07/2018.       MD JERI Ni / PN  D: 11/07/2018 09:05     T: 11/07/2018 10:19  JOB #: [id number] BACK PAIN

## 2022-04-24 NOTE — ED PROVIDER NOTE - PHYSICAL EXAMINATION
GEN: WD/WN, NAD  HEAD: NC/AT; no periorbital ecchymosis or Sepulveda's sign. Left temporal and periorbital ecchymosis.   NEURO: Alert, oriented. CN II-XII intact. Clear speech.  SILT all ext. Motor 5/5 all ext. F-N intact.  Gait steady.   EYE: PERRL, EOMI. Corrected VA. 20/40 in each eye. AC clear. No hyphema   ENT: Airway patent.  No dental injury. No epistaxis or rhinorrhea. No otorrhea or hemotympanum. no epistaxis. nose non tender, no septal hematoma, no sepulveda signs.   PULM: No resp distress. Lungs CTA bilat.  CV: RRR, S1S2  GI: Abdomen soft, nontender  MSK: Neck with painless ROM; no midline neck tenderness.  Extremities without tenderness, swelling, or ROM limitation. Mild tenderness to left scapular regions, tenderness to left shoulder glenoid regions, no elbow tenderness, no wrist/snuff box tenderness, tenderness to the left hand region of 4th and 5th metacarpals, no swelling, no ROM limitations to any of these areas. No tenderness down length of spine. LE full ROM, no tenderness or swelling.   SKIN: Intact.  Normal color and turgor.

## 2022-04-24 NOTE — ED PROVIDER NOTE - OBJECTIVE STATEMENT
75 y/o F with a pmhx of anxiety, prior rib fractures due to fall, presents to the ED s/p after tripping and falling on sidewalk. States she was walking on the sidewalk this evening, and reports she fell and it had happened all so quickly thinking she tripped and was witnessed by her granddaughter boyfriend. Reports no LOC. Pt is c/o left sided HA, to the temporoparietal region, some left eye discomfort, left shoulder pain,, left sided rib tenderness, left hand tenderness, sustained contusions to the left temporal and periorbital regions, mildly nauseated. States no vomiting, no neck pain, some soreness to the left scapular regions. No other areas of back pain. Left sided rib pain worsening with deep breathing. No abdominal pain. No pain to other extremities. Takes medications for anxiety and is not taking any blood thinners. Has had a hand surgery for severe arthritis. No known allergies and is not vaccinated for covid. 75 y/o F with a pmhx of anxiety, prior rib fractures due to fall, presents to the ED s/p after tripping and falling on sidewalk. States she was walking on the sidewalk this evening, and that she tripped on her sneaker; it was witnessed by her granddaughter's boyfriend. Reports no LOC. Pt c/o left sided HA, to the temporoparietal region, some left eye discomfort, left shoulder pain,, left sided rib tenderness, left hand tenderness, sustained contusions to the left temporal and periorbital regions, mildly nauseated. States no vomiting, no neck pain, some soreness to the left scapular regions. No other areas of back pain. Left sided rib pain worsening with deep breathing. No abdominal pain. No pain to other extremities. Takes medications for anxiety and is not taking any blood thinners. Has had a hand surgery for severe arthritis. No known allergies and is not vaccinated for covid.

## 2022-04-24 NOTE — ED PROVIDER NOTE - NSICDXPASTSURGICALHX_GEN_ALL_CORE_FT
PAST SURGICAL HISTORY:  History of hand surgery Right & Left    S/P arthroscopic surgery of right knee 1998    S/P arthroscopy of right shoulder About 5 years ago

## 2022-04-24 NOTE — ED PROVIDER NOTE - NS ED ATTENDING STATEMENT MOD
This was a shared visit with the KAREN. I reviewed and verified the documentation and independently performed the documented:

## 2022-04-24 NOTE — ED ADULT NURSE NOTE - OBJECTIVE STATEMENT
Pt arrived to ED AAOX4, spont unlab breathing on RA, NAD. PT was brought in by EMS s/p mechanical trip and fall. Pt is now c/o L sided head pain, L hand pain, and L knee pain. Pt fell onto her left side. Pt has a bump on L eyebrow and 2 abrasions on L cheek. No other obvious signs of injury. No vision changes, no LOC, no blood thinners. Pt denies dizziness or weakness prior to fall.

## 2022-04-24 NOTE — ED PROVIDER NOTE - NSFOLLOWUPINSTRUCTIONS_ED_ALL_ED_FT
Rest and elevate hand.  Wear splint as directed.  Follow up with your hand surgeon this week.  =======================    Hand Fracture    WHAT YOU NEED TO KNOW:    A hand fracture is a break in a bone in your hand.    DISCHARGE INSTRUCTIONS:    Return to the emergency department if:   •You have severe pain that does not get better, even with pain medicine.      •Your injured hand or forearm is cold, numb, or pale.       •Your cast or splint gets wet, damaged, or comes off.      Call your doctor or hand specialist if:   •You have new sores around your cast or splint.      •You notice a bad smell coming from under your cast.      •You have questions or concerns about your condition or care.      Medicines: You may need any of the following:   •NSAIDs help decrease swelling and pain or fever. This medicine is available with or without a doctor's order. NSAIDs can cause stomach bleeding or kidney problems in certain people. If you take blood thinner medicine, always ask your healthcare provider if NSAIDs are safe for you. Always read the medicine label and follow directions.      •Acetaminophen decreases pain and fever. It is available without a doctor's order. Ask how much to take and how often to take it. Follow directions. Read the labels of all other medicines you are using to see if they also contain acetaminophen, or ask your doctor or pharmacist. Acetaminophen can cause liver damage if not taken correctly. Do not use more than 4 grams (4,000 milligrams) total of acetaminophen in one day.       •Prescription pain medicine may be given. Ask your healthcare provider how to take this medicine safely. Some prescription pain medicines contain acetaminophen. Do not take other medicines that contain acetaminophen without talking to your healthcare provider. Too much acetaminophen may cause liver damage. Prescription pain medicine may cause constipation. Ask your healthcare provider how to prevent or treat constipation.       •Take your medicine as directed. Contact your healthcare provider if you think your medicine is not helping or if you have side effects. Tell him or her if you are allergic to any medicine. Keep a list of the medicines, vitamins, and herbs you take. Include the amounts, and when and why you take them. Bring the list or the pill bottles to follow-up visits. Carry your medicine list with you in case of an emergency.      Manage your symptoms:   •Wear a splint as directed. Do not remove the splint until you follow up with your healthcare provider or hand specialist.      •Apply ice on your hand for 15 to 20 minutes every hour or as directed. Use an ice pack, or put crushed ice in a plastic bag. Cover it with a towel before you apply it to your skin. Ice helps prevent tissue damage and decreases swelling and pain.      •Elevate your hand above the level of your heart as often as you can. This will help decrease swelling and pain. Prop your hand on pillows or blankets to keep it elevated comfortably.             •Go to physical therapy as directed. A physical therapist teaches you exercises to help improve movement and strength and to decrease pain.      Bathing with a cast or splint: Your healthcare provider will tell you when it is okay to take a bath or shower. Do not let your cast or splint get wet. Before bathing, cover the cast or splint with a plastic bag. Tape the bag to your skin above the cast or splint to seal out water. Keep your hand out of the water in case the bag breaks or tears.    Cast or splint care:   •Check the skin around the cast or splint for redness or sores every day.      •Do not push down or lean on any part of the cast or splint.      •Do not use a sharp or pointed object to scratch your skin under the cast or splint.      Activity: You may not be able to drive for up to 2 weeks. Ask when it is safe for you to drive and return to other activities, such as sports.    Follow up with your doctor or hand specialist as directed: You may need to return to have your cast, splint, or stitches removed. Write down your questions so you remember to ask them during your visits.  ========================    Splint Care    WHAT YOU NEED TO KNOW:    Splint care is important to help protect your splint until it comes off. Some splints are made of fiberglass or plaster that will need to dry and harden. Splint care will help the splint dry and harden correctly. Even after your splint hardens, it can be damaged.    DISCHARGE INSTRUCTIONS:    Return to the emergency department if:   •You have increased pain.      •Your fingers or toes are numb or tingling.      •You feel burning or stinging around your injury.      •Your nails, fingers, or toes turn pale, blue, or gray, and feel cold.      •You have new or increased trouble moving your fingers or toes.      •Your swelling gets worse.      •The skin under your splint is bleeding or leaking pus.       Contact your healthcare provider if:   •Your hard splint gets wet or is damaged.      •You have a fever.      •Your splint feels tighter.      •You have itchy, dry skin under your splint that is getting worse.      •The skin under your splint is red, or you have a new sore.      •You notice a bad smell coming from your splint.       •You have questions or concerns about your condition or care.      How to care for your splint:   •Wait for your hard splint to harden completely. You may have to wait up to 3 days before you can walk on a plaster splint.      •Check your splint and the skin around it each day. Check your splint for damage, such as cracks and breaks. Check your skin for redness, increased swelling, and sores. Loosen the elastic bandage around your splint if it feels too tight.      •Keep your splint clean and dry. Keep dirt out of your splint. Before you bathe, wrap your hard splint with 2 layers of plastic. Then put a plastic bag over it. Keep the plastic bag tightly sealed. You can also ask your healthcare provider about waterproof shields. Do not put your hard splint in the water, even with a plastic bag over it. A wet splint can make your skin itchy, and may lead to infection.      •Do not put powders or deodorants inside your splint. These can dry your skin and increase itching.       •Do not try to scratch the skin inside your hard splint with sharp objects. Sharp objects can break off inside your splint or hurt your skin.       •Do not pull the padding out of your splint. The padding inside your splint protects your skin. You may develop a sore on your skin if you take out the padding.      Follow up with your healthcare provider as directed within 1 to 2 weeks: Write down your questions so you remember to ask them during your visits.

## 2022-04-25 VITALS
OXYGEN SATURATION: 98 % | TEMPERATURE: 98 F | RESPIRATION RATE: 18 BRPM | SYSTOLIC BLOOD PRESSURE: 161 MMHG | HEART RATE: 69 BPM | DIASTOLIC BLOOD PRESSURE: 89 MMHG

## 2022-04-25 NOTE — ED PROCEDURE NOTE - CPROC ED POST PROC CARE GUIDE1
follow up with hand surgeon this week/Verbal/written post procedure instructions were given to patient/caregiver./Elevate the injured extremity as instructed./Keep the cast/splint/dressing clean and dry.

## 2022-04-26 ENCOUNTER — APPOINTMENT (OUTPATIENT)
Dept: ORTHOPEDIC SURGERY | Facility: CLINIC | Age: 77
End: 2022-04-26
Payer: MEDICARE

## 2022-04-26 VITALS — BODY MASS INDEX: 21.49 KG/M2 | HEIGHT: 65 IN | WEIGHT: 129 LBS

## 2022-04-26 DIAGNOSIS — S62.317A DISPLACED FRACTURE OF BASE OF FIFTH METACARPAL BONE, LEFT HAND, INITIAL ENCOUNTER FOR CLOSED FRACTURE: ICD-10-CM

## 2022-04-26 DIAGNOSIS — M79.642 PAIN IN LEFT HAND: ICD-10-CM

## 2022-04-26 DIAGNOSIS — S62.345A NONDISPLACED FRACTURE OF BASE OF FOURTH METACARPAL BONE, LEFT HAND, INITIAL ENCOUNTER FOR CLOSED FRACTURE: ICD-10-CM

## 2022-04-26 PROCEDURE — 99214 OFFICE O/P EST MOD 30 MIN: CPT

## 2022-04-26 PROCEDURE — 73130 X-RAY EXAM OF HAND: CPT | Mod: LT

## 2022-04-27 NOTE — ASSESSMENT
[FreeTextEntry1] : The fracture was discussed with the patient at length.  We discussed that although there may be some imperfect alignment on XRay, the patient would likely do best with early motion exercises to minimize stiffness.  We discussed that flexion at the fracture site was well tolerated.  We discussed the importance of good function over good Xrays and that performing surgery may lead to unnecessary scar tissue formation.  We discussed the benefit of randolph strapping and early range of motion.  We did discuss the goals of surgery when indicated for malrotation or extreme flexion and what to expect.  The patient may benefit from therapy.  Risks of treatment include, but are not limited to persistent pain, stiffness, fracture displacement, need for future surgery, mal-union, non-union. All patient questions were answered. Patient expressed understanding and would like to proceed with the non operative treatment plan. \par Pt informed that we will treat this with randolph loop x  6 weeks.\par Pt will rto in 2 weeks for xray and examination of the left hand.\par Recommend Neurology consultation due to hx of repeated falls.\par

## 2022-04-27 NOTE — PHYSICAL EXAM
[Left] : left hand [de-identified] : Left hand with resolving ecchymosis.\par Left thumb deformity from previous surgery.\par TTP over the left 5th MC. \par There is no rotational deformity noted.\par All digits are nvi. \par There is minimal ttp over the left wrist.\par Chaudhari Test is negative.\par TFCC Grind Test is negative. \par Carpal Tunnel Tinel Sign is negative.  [FreeTextEntry1] : left hand with acute 5th mc base (extra-articular) fracture. Nonacute 3rd MC fx noted.\par Left 1st mcp deformity noted from previous surgery.

## 2022-04-27 NOTE — HISTORY OF PRESENT ILLNESS
[8] : 8 [5] : 5 [Localized] : localized [Sharp] : sharp [Constant] : constant [Retired] : Work status: retired [de-identified] : 4/26/2022: RHD 75 yo female tripped and fell 4/24/2022 and injured her left hand. Pt was treated at [facility] ED and informed that she has a hand fracture and she presents in an ulnar gutter splint.\par \par PMH: Anxiety, Depression, Sleep Disorder.\par Allergies: NKDA.\par Occupation: Retired. [] : This patient has had an injection before: no [FreeTextEntry1] : left hand [FreeTextEntry5] : Patient fell down and injured the let hand. Went to [facility] and received xray. Fractured left hand. [de-identified] : xrays [de-identified] : none

## 2022-04-27 NOTE — REVIEW OF SYSTEMS
[Joint Pain] : joint pain [Joint Stiffness] : joint stiffness [Joint Swelling] : joint swelling [Negative] : Heme/Lymph [Arthralgia] : no arthralgia

## 2022-05-09 ENCOUNTER — APPOINTMENT (OUTPATIENT)
Dept: ORTHOPEDIC SURGERY | Facility: CLINIC | Age: 77
End: 2022-05-09
Payer: MEDICARE

## 2022-05-09 VITALS — WEIGHT: 129 LBS | BODY MASS INDEX: 21.49 KG/M2 | HEIGHT: 65 IN

## 2022-05-09 DIAGNOSIS — M41.25 OTHER IDIOPATHIC SCOLIOSIS, THORACOLUMBAR REGION: ICD-10-CM

## 2022-05-09 DIAGNOSIS — M53.9 DORSOPATHY, UNSPECIFIED: ICD-10-CM

## 2022-05-09 PROCEDURE — 72100 X-RAY EXAM L-S SPINE 2/3 VWS: CPT

## 2022-05-09 PROCEDURE — 99213 OFFICE O/P EST LOW 20 MIN: CPT

## 2022-05-09 RX ORDER — TRAMADOL HYDROCHLORIDE 50 MG/1
50 TABLET, COATED ORAL 3 TIMES DAILY
Qty: 15 | Refills: 0 | Status: ACTIVE | COMMUNITY
Start: 2022-05-09 | End: 1900-01-01

## 2022-05-09 RX ORDER — CYCLOBENZAPRINE HYDROCHLORIDE 10 MG/1
10 TABLET, FILM COATED ORAL 3 TIMES DAILY
Qty: 60 | Refills: 0 | Status: ACTIVE | COMMUNITY
Start: 2022-05-09 | End: 1900-01-01

## 2022-05-09 RX ORDER — METHYLPREDNISOLONE 4 MG/1
4 TABLET ORAL
Qty: 1 | Refills: 0 | Status: ACTIVE | COMMUNITY
Start: 2022-05-09

## 2022-05-09 NOTE — PHYSICAL EXAM
[Right] : right knee [] : ligamentously stable [TWNoteComboBox7] : flexion 125 degrees [de-identified] : extension 0 degrees

## 2022-05-09 NOTE — HISTORY OF PRESENT ILLNESS
[5] : 5 [3] : 3 [Dull/Aching] : dull/aching [Sharp] : sharp [Frequent] : frequent [Meds] : meds [] : no [de-identified] : none [de-identified] : 5/9/22: Right knee better since zilretta.  Fell 3 weeks ago and since then having pain from right hip down right leg to foot.  Feels she is having difficulty with balance.\par \par Previous doc:\par 7/21/20: Ms. Gertrudis Jay, a 74-year-old female, presents today for right hip and pelvis pain after tripping over her dog on 7/4/20. Known to me for right HARISH in 2018. 1 week s/p ORIF of her right wrist by \par 8/21/20: Pt present for f/u of Closed fx of ramus of right pubis, Pt reports some improvement and more ROM, Only started Ptherapy today.\par 6/28/21: Continued pain in the right knee - Here for HA injections\par 7/12/21: Orthovisc #3 right knee.\par 7/20/21: Orthovisc #4 right knee, no change.\par 08/23/21: s/p completing series of orthovisc injections, feels that she has not yet obtained any relief. Feels that her right knee frequently will buckle , mostly after getting up from any sustained sitting. Overall pain is the same that it had been, now with nocturnal awakening.\par 11/8/21: f/up for R knee. She states the pain returned over the past few weeks with no new injury.\par 3/8/22: 75yo F, known to Dr. Claros, with R knee pain after a fall down stairs on 3/4/22. She was initially seen at the ER and placed in a brace. she has pain in the knee but better than from when she fell but sig pain even with standing \par 4/11/22: RIght knee pain worsening recently, zilretta last time helped a lot.

## 2022-05-09 NOTE — IMAGING
[de-identified] : Lspine: Decreased ROM.  Spasm.  Tenderness right paraspinals.  Pos SLR\par \par XR LSP: Right HARISH well fixed in good position.  Lspine severe degen scoliosis.

## 2022-05-09 NOTE — ASSESSMENT
[FreeTextEntry1] : Previous doc:\par X-rays reveal right inferior superior rami fracture and components well fixed in good position. Recommended take 1 81mg of asprin 2x day. to prevent DVT - No restrictions. Activity as tolerated.\par 8/21/20: New Xrays reveal routine healing with callus present, Reports improvement but still with sig pain, PT just starting Ptherapy, Prescribed tramadol for pain relief.\par 6/28/21: Right hip doing well; Continued pain in the right knee - Has had cortisone injection in the past with some relief and would like HA injections today\par 7/12/21: Inj tolerated well. Asp 5cc.\par 7/20/21: Inj tolerated well. Asp 5cc.\par 8/23/21: No relief from orthovisc - cortisone inj today tolerated well.\par 11/8/21: short term relief from CSI in august. Indicated for Zilretta today to hopefully give her longer relief. Tolerated well. f/up in 3 months.\par 3/8/22: She has sig swelling in the soft tissue and tenderness a- adv OA but no change and no acute fracture - recommend Ice rest and nsaids as needed - if cont pain in a month will try injection\par 4/11/22: Pain returned - zilretta right knee tolerated well.  Asp 10cc clear yellow fluid.\par \par 5/9/22: Severe degen scoliosis causing sciatic symptoms in right leg.  Planning to see neuro regarding dizziness and balance difficulty.  MDP flexeril and tramadol, PT.  Reeval in 4 weeks.

## 2022-05-10 ENCOUNTER — APPOINTMENT (OUTPATIENT)
Dept: ORTHOPEDIC SURGERY | Facility: CLINIC | Age: 77
End: 2022-05-10
Payer: MEDICARE

## 2022-05-10 VITALS — HEIGHT: 65 IN | BODY MASS INDEX: 21.49 KG/M2 | WEIGHT: 129 LBS

## 2022-05-10 DIAGNOSIS — S62.327D DISPLACED FRACTURE OF SHAFT OF FIFTH METACARPAL BONE, LEFT HAND, SUBSEQUENT ENCOUNTER FOR FRACTURE WITH ROUTINE HEALING: ICD-10-CM

## 2022-05-10 PROCEDURE — 26600 TREAT METACARPAL FRACTURE: CPT

## 2022-05-10 PROCEDURE — 99213 OFFICE O/P EST LOW 20 MIN: CPT | Mod: 57

## 2022-05-10 PROCEDURE — 29280 STRAPPING OF HAND OR FINGER: CPT

## 2022-05-10 PROCEDURE — 73130 X-RAY EXAM OF HAND: CPT | Mod: LT

## 2022-05-17 NOTE — HISTORY OF PRESENT ILLNESS
[Dull/Aching] : dull/aching [Localized] : localized [Retired] : Work status: retired [8] : 8 [5] : 5 [Sharp] : sharp [Constant] : constant [de-identified] : 5/10/2022: Pt here for 2 week fu of a left 4th and 5th MC fractures. Pt reports that her pain has improved with use of randolph loops.\par \par 4/26/2022: RHD 77 yo female tripped and fell 4/24/2022 and injured her left hand. Pt was treated at [facility] ED and informed that she has a hand fracture and she presents in an ulnar gutter splint.\par \par PMH: Anxiety, Depression, Sleep Disorder.\par Allergies: NKDA.\par Occupation: Retired. [] : Post Surgical Visit: no [FreeTextEntry1] : left hand [FreeTextEntry5] : Patient fell down and injured the let hand. Went to [facility] and received xray. Fractured left hand. [de-identified] : xrays [de-identified] : none

## 2022-05-17 NOTE — ASSESSMENT
[FreeTextEntry1] : Fracture with no change in alignment.\par Pt will continue Randolph loop x 4 more weeks (nwb to LUE) and rto in 4 weeks for xray of the left hand and examination.\par The fracture was again discussed with the patient at length.  We discussed that although there may be some imperfect alignment on XRay, the patient would likely do best with early motion exercises to minimize stiffness.  We discussed that flexion at the fracture site was well tolerated.  We discussed the importance of good function over good Xrays and that performing surgery may lead to unnecessary scar tissue formation.  We discussed the benefit of randolph strapping and early range of motion.  We did discuss the goals of surgery when indicated for malrotation or extreme flexion and what to expect.  The patient may benefit from therapy.  Risks of treatment include, but are not limited to persistent pain, stiffness, fracture displacement, need for future surgery, mal-union, non-union. All patient questions were answered. Patient expressed understanding and would like to proceed with the non operative treatment plan.

## 2022-05-17 NOTE — REVIEW OF SYSTEMS
[Joint Pain] : joint pain [Negative] : Heme/Lymph [Joint Stiffness] : joint stiffness [Joint Swelling] : joint swelling [Arthralgia] : no arthralgia

## 2022-05-17 NOTE — PHYSICAL EXAM
[Left] : left hand [de-identified] : Left hand with mild dorsal swelling over the 4th and 5th MC region.\par Left thumb deformity from previous surgery.\par Mild TTP over the left 4th and 5th MCs. \par There is no rotational deformity noted.\par All digits are nvi. \par No ttp over the left wrist.\par Chaudhari Test is negative.\par TFCC Grind Test is negative. \par Thenar atrophy is noted.\par Carpal Tunnel Tinel Sign is negative.  [FreeTextEntry1] : left hand with acute 4th and5th mc base (extra-articular) fracture with no change in alignment. No callous formation noted. Nonacute (healed)  3rd MC fx noted.\par Left 1st mcp deformity noted from previous surgery.

## 2022-06-07 ENCOUNTER — APPOINTMENT (OUTPATIENT)
Dept: ORTHOPEDIC SURGERY | Facility: CLINIC | Age: 77
End: 2022-06-07
Payer: MEDICARE

## 2022-06-07 VITALS — WEIGHT: 128 LBS | HEIGHT: 65 IN | BODY MASS INDEX: 21.33 KG/M2

## 2022-06-07 DIAGNOSIS — S62.345D NONDISPLACED FRACTURE OF BASE OF FOURTH METACARPAL BONE, LEFT HAND, SUBSEQUENT ENCOUNTER FOR FRACTURE WITH ROUTINE HEALING: ICD-10-CM

## 2022-06-07 DIAGNOSIS — Z87.448 PERSONAL HISTORY OF OTHER DISEASES OF URINARY SYSTEM: ICD-10-CM

## 2022-06-07 DIAGNOSIS — S62.348K: ICD-10-CM

## 2022-06-07 PROCEDURE — 99024 POSTOP FOLLOW-UP VISIT: CPT

## 2022-06-07 PROCEDURE — 73130 X-RAY EXAM OF HAND: CPT | Mod: LT

## 2022-06-07 NOTE — ASSESSMENT
[FreeTextEntry1] : Fractures have healed in acceptable alignment.\par Pt allowed to rto prn and encouraged to call with any questions/concerns.

## 2022-06-07 NOTE — HISTORY OF PRESENT ILLNESS
[8] : 8 [5] : 5 [Dull/Aching] : dull/aching [Localized] : localized [Sharp] : sharp [Constant] : constant [Retired] : Work status: retired [de-identified] : 6/7/2022: Pt here for 6 week f/u of left 4th and 5th MC base fractures. Pt states her pain has resolved. \par \par 5/10/2022: Pt here for 2 week fu of a left 4th and 5th MC fractures. Pt reports that her pain has improved with use of randolph loops.\par \par 4/26/2022: RHD 75 yo female tripped and fell 4/24/2022 and injured her left hand. Pt was treated at [facility] ED and informed that she has a hand fracture and she presents in an ulnar gutter splint.\par \par PMH: Anxiety, Depression, Sleep Disorder.\par Allergies: NKDA.\par Occupation: Retired. [] : Post Surgical Visit: no [FreeTextEntry1] : left hand [FreeTextEntry5] : Patient fell down and injured the let hand. Went to [facility] and received xray. Fractured left hand. [de-identified] : caring things [de-identified] : xrays [de-identified] : none

## 2022-06-07 NOTE — PHYSICAL EXAM
[Left] : left hand [de-identified] : Left hand swelling over the 4th and 5th MC regions has resolved.\par Left thumb deformity from previous surgery.\par There is no TTP over the left 4th and 5th MC base. \par There is no rotational deformity noted.\par All digits are nvi. \par No ttp over the left wrist.\par Chaudhari Test is negative.\par TFCC Grind Test is negative. \par Thenar atrophy is noted.\par Carpal Tunnel Tinel Sign is negative.  [FreeTextEntry1] :  4th and 5th mc base (extra-articular) fracture with no change in alignment and solid callous formation noted.

## 2022-06-13 ENCOUNTER — APPOINTMENT (OUTPATIENT)
Dept: ORTHOPEDIC SURGERY | Facility: CLINIC | Age: 77
End: 2022-06-13

## 2022-08-16 ENCOUNTER — APPOINTMENT (OUTPATIENT)
Dept: ORTHOPEDIC SURGERY | Facility: CLINIC | Age: 77
End: 2022-08-16

## 2022-08-16 VITALS — WEIGHT: 128 LBS | BODY MASS INDEX: 21.33 KG/M2 | HEIGHT: 65 IN

## 2022-08-16 PROCEDURE — 99214 OFFICE O/P EST MOD 30 MIN: CPT | Mod: 25

## 2022-08-16 PROCEDURE — 20611 DRAIN/INJ JOINT/BURSA W/US: CPT

## 2022-08-16 NOTE — PHYSICAL EXAM
[Right] : right knee [] : ligamentously stable [TWNoteComboBox7] : flexion 125 degrees [de-identified] : extension 0 degrees

## 2022-08-16 NOTE — IMAGING
[de-identified] : RIGHT KNEE\par Mild Effusion\par +Medial joint line tenderness\par +Lateral joint line tenderness\par ROM 0-125\par 5/5 Strength\par Ligamentously stable \par NVI\par Non-antalgic gait w/o assistance

## 2022-08-16 NOTE — HISTORY OF PRESENT ILLNESS
[7] : 7 [Sitting] : sitting [de-identified] : 8/16/22: Here for follow up R knee. Zilretta has been beneficial to her. Would like to repeat today. Orthovisc provided no relief in the past \par \par Previous doc:\par 7/21/20: Ms. Gertrudis Jay, a 74-year-old female, presents today for right hip and pelvis pain after tripping over her dog on 7/4/20. Known to me for right HARISH in 2018. 1 week s/p ORIF of her right wrist by \par 8/21/20: Pt present for f/u of Closed fx of ramus of right pubis, Pt reports some improvement and more ROM, Only started Ptherapy today.\par 6/28/21: Continued pain in the right knee - Here for HA injections\par 7/12/21: Orthovisc #3 right knee.\par 7/20/21: Orthovisc #4 right knee, no change.\par 08/23/21: s/p completing series of orthovisc injections, feels that she has not yet obtained any relief. Feels that her right knee frequently will buckle , mostly after getting up from any sustained sitting. Overall pain is the same that it had been, now with nocturnal awakening.\par 11/8/21: f/up for R knee. She states the pain returned over the past few weeks with no new injury.\par 3/8/22: 77yo F, known to Dr. Claros, with R knee pain after a fall down stairs on 3/4/22. She was initially seen at the ER and placed in a brace. she has pain in the knee but better than from when she fell but sig pain even with standing \par 4/11/22: RIght knee pain worsening recently, zilretta last time helped a lot.\par 5/9/22: Right knee better since zilretta.  Fell 3 weeks ago and since then having pain from right hip down right leg to foot.  Feels she is having difficulty with balance. [FreeTextEntry5] : fu

## 2022-08-16 NOTE — DISCUSSION/SUMMARY
[de-identified] : The risks, benefits, contents and alternatives to injection were explained in full to the patient.  Risks outlined include but are not limited to infection, sepsis, bleeding, scarring, skin discoloration, temporary increase in pain, syncopal episode, failure to resolve symptoms, allergic reaction, flare reaction, permanent white skin discoloration, symptom recurrence, and elevation of blood sugar in diabetics.  Patient understood the risks.  All questions were answered.  After discussion of options, patient requested an injection.  Oral informed consent was obtained and sterile prep was done of the injection site.  Sterile technique was used to introduce the mixture.  Patient tolerated the procedure well.  Patient advised to ice the injection site this evening.  Signs and symptoms of infection reviewed and patient advised to call immediately for redness, fevers, and/or chills. \par \par Progress note completed by Yvonne Amaya PA-C.\par The documentation recorded by the PA accurately reflects the service I personally performed and the decisions made by me. -Dr. Claros

## 2022-08-16 NOTE — PROCEDURE
[Large Joint Injection] : Large joint injection [Right] : of the right [Knee] : knee [Pain] : pain [Inflammation] : inflammation [X-ray evidence of Osteoarthritis on this or prior visit] : x-ray evidence of Osteoarthritis on this or prior visit [Alcohol] : alcohol [Betadine] : betadine [Ethyl Chloride sprayed topically] : ethyl chloride sprayed topically [Sterile technique used] : sterile technique used [___ cc    1%] : Lidocaine ~Vcc of 1%  [___ cc    0.25%] : Bupivacaine (Marcaine) ~Vcc of 0.25%  [___ cc    32 units 5mg] : Zilretta ~Vcc of 32 units 5 mg  [] : Patient tolerated procedure well [Call if redness, pain or fever occur] : call if redness, pain or fever occur [Apply ice for 15min out of every hour for the next 12-24 hours as tolerated] : apply ice for 15 minutes out of every hour for the next 12-24 hours as tolerated [Patient was advised to rest the joint(s) for ____ days] : patient was advised to rest the joint(s) for [unfilled] days [Previous OTC use and PT nontherapeutic] : patient has tried OTC's including aspirin, Ibuprofen, Aleve, etc or prescription NSAIDS, and/or exercises at home and/or physical therapy without satisfactory response [Risks, benefits, alternatives discussed / Verbal consent obtained] : the risks benefits, and alternatives have been discussed, and verbal consent was obtained [Prior failure or difficult injection] : prior failure or difficult injection [All ultrasound images have been permanently captured and stored accordingly in our picture archiving and communication system] : All ultrasound images have been permanently captured and stored accordingly in our picture archiving and communication system [Visualization of the needle and placement of injection was performed without complication] : visualization of the needle and placement of injection was performed without complication [Effusion] : effusion [de-identified] : 20cc  [de-identified] : clear yellow

## 2022-08-16 NOTE — ASSESSMENT
[FreeTextEntry1] : Previous doc:\par X-rays reveal right inferior superior rami fracture and components well fixed in good position. Recommended take 1 81mg of asprin 2x day. to prevent DVT - No restrictions. Activity as tolerated.\par 8/21/20: New Xrays reveal routine healing with callus present, Reports improvement but still with sig pain, PT just starting Ptherapy, Prescribed tramadol for pain relief.\par 6/28/21: Right hip doing well; Continued pain in the right knee - Has had cortisone injection in the past with some relief and would like HA injections today\par 7/12/21: Inj tolerated well. Asp 5cc.\par 7/20/21: Inj tolerated well. Asp 5cc.\par 8/23/21: No relief from orthovisc - cortisone inj today tolerated well.\par 11/8/21: short term relief from CSI in august. Indicated for Zilretta today to hopefully give her longer relief. Tolerated well. f/up in 3 months.\par 3/8/22: She has sig swelling in the soft tissue and tenderness a- adv OA but no change and no acute fracture - recommend Ice rest and nsaids as needed - if cont pain in a month will try injection\par 4/11/22: Pain returned - zilretta right knee tolerated well.  Asp 10cc clear yellow fluid.\par 5/9/22: Severe degen scoliosis causing sciatic symptoms in right leg.  Planning to see neuro regarding dizziness and balance difficulty.  MDP flexeril and tramadol, PT.  Reeval in 4 weeks.\par \par 8/16/22: Repeat Zilretta performed today, tolerated well. f/up in 3 months prn

## 2022-09-11 ENCOUNTER — NON-APPOINTMENT (OUTPATIENT)
Age: 77
End: 2022-09-11

## 2022-09-26 ENCOUNTER — APPOINTMENT (OUTPATIENT)
Dept: ORTHOPEDIC SURGERY | Facility: CLINIC | Age: 77
End: 2022-09-26

## 2022-11-28 ENCOUNTER — APPOINTMENT (OUTPATIENT)
Dept: ORTHOPEDIC SURGERY | Facility: CLINIC | Age: 77
End: 2022-11-28

## 2022-11-28 VITALS — WEIGHT: 128 LBS | BODY MASS INDEX: 21.33 KG/M2 | HEIGHT: 65 IN

## 2022-11-28 PROCEDURE — 20610 DRAIN/INJ JOINT/BURSA W/O US: CPT

## 2022-11-28 PROCEDURE — 99214 OFFICE O/P EST MOD 30 MIN: CPT | Mod: 25

## 2022-11-28 PROCEDURE — J3490N: CUSTOM

## 2022-11-28 NOTE — PROCEDURE
[Knee] : knee [X-ray evidence of Osteoarthritis on this or prior visit] : x-ray evidence of Osteoarthritis on this or prior visit [Alcohol] : alcohol [___ cc    32 units 5mg] : Zilretta ~Vcc of 32 units 5 mg  [Effusion] : effusion [Patient was advised to rest the joint(s) for ____ days] : patient was advised to rest the joint(s) for [unfilled] days [Prior failure or difficult injection] : prior failure or difficult injection [All ultrasound images have been permanently captured and stored accordingly in our picture archiving and communication system] : All ultrasound images have been permanently captured and stored accordingly in our picture archiving and communication system [Visualization of the needle and placement of injection was performed without complication] : visualization of the needle and placement of injection was performed without complication [Large Joint Injection] : Large joint injection [Right] : of the right [Subacromial Space] : subacromial space [Pain] : pain [Inflammation] : inflammation [Betadine] : betadine [Ethyl Chloride sprayed topically] : ethyl chloride sprayed topically [Sterile technique used] : sterile technique used [___ cc    6mg] :  Betamethasone (Celestone) ~Vcc of 6mg [___ cc    1%] : Lidocaine ~Vcc of 1%  [___ cc    0.25%] : Bupivacaine (Marcaine) ~Vcc of 0.25%  [] : Patient tolerated procedure well [Call if redness, pain or fever occur] : call if redness, pain or fever occur [Apply ice for 15min out of every hour for the next 12-24 hours as tolerated] : apply ice for 15 minutes out of every hour for the next 12-24 hours as tolerated [Previous OTC use and PT nontherapeutic] : patient has tried OTC's including aspirin, Ibuprofen, Aleve, etc or prescription NSAIDS, and/or exercises at home and/or physical therapy without satisfactory response [Patient had decreased mobility in the joint] : patient had decreased mobility in the joint [Risks, benefits, alternatives discussed / Verbal consent obtained] : the risks benefits, and alternatives have been discussed, and verbal consent was obtained [de-identified] : 20cc  [de-identified] : clear yellow

## 2022-11-28 NOTE — ASSESSMENT
[FreeTextEntry1] : Previous doc:\par X-rays reveal right inferior superior rami fracture and components well fixed in good position. Recommended take 1 81mg of asprin 2x day. to prevent DVT - No restrictions. Activity as tolerated.\par 8/21/20: New Xrays reveal routine healing with callus present, Reports improvement but still with sig pain, PT just starting Ptherapy, Prescribed tramadol for pain relief.\par 6/28/21: Right hip doing well; Continued pain in the right knee - Has had cortisone injection in the past with some relief and would like HA injections today\par 7/12/21: Inj tolerated well. Asp 5cc.\par 7/20/21: Inj tolerated well. Asp 5cc.\par 8/23/21: No relief from orthovisc - cortisone inj today tolerated well.\par 11/8/21: short term relief from [facility]SI in august. Indicated for Zilretta today to hopefully give her longer relief. Tolerated well. f/up in 3 months.\par 3/8/22: She has sig swelling in the soft tissue and tenderness a- adv OA but no change and no acute fracture - recommend Ice rest and nsaids as needed - if cont pain in a month will try injection\par 4/11/22: Pain returned - zilretta right knee tolerated well.  Asp 10cc clear yellow fluid.\par 5/9/22: Severe degen scoliosis causing sciatic symptoms in right leg.  Planning to see neuro regarding dizziness and balance difficulty.  MDP flexeril and tramadol, PT.  Reeval in 4 weeks.\par 8/16/22: Repeat Zilretta performed today, tolerated well. f/up in 3 months prn\par \par 11/28/22: Repeat zilretta right knee tolerated well.  RIght shoulder bursitis - prior cuff repair - inj today and if no improvement will send to shoulder specialist.  Left shoulder pain as well, will return in 2 weeks for inj if right shoulder is good and left still hurts.

## 2022-11-28 NOTE — HISTORY OF PRESENT ILLNESS
[7] : 7 [5] : 5 [Sharp] : sharp [Occasional] : occasional [Leisure] : leisure [Rest] : rest [Meds] : meds [Ice] : ice [Sitting] : sitting [Walking] : walking [de-identified] : 11/28/22: RIght knee pain returned.  B/L shoulder pain as well, right > left.\par \par Previous doc:\par 7/21/20: Ms. Gertrudis Jay, a 74-year-old female, presents today for right hip and pelvis pain after tripping over her dog on 7/4/20. Known to me for right HARISH in 2018. 1 week s/p ORIF of her right wrist by \par 8/21/20: Pt present for f/u of Closed fx of ramus of right pubis, Pt reports some improvement and more ROM, Only started Ptherapy today.\par 6/28/21: Continued pain in the right knee - Here for HA injections\par 7/12/21: Orthovisc #3 right knee.\par 7/20/21: Orthovisc #4 right knee, no change.\par 08/23/21: s/p completing series of orthovisc injections, feels that she has not yet obtained any relief. Feels that her right knee frequently will buckle , mostly after getting up from any sustained sitting. Overall pain is the same that it had been, now with nocturnal awakening.\par 11/8/21: f/up for R knee. She states the pain returned over the past few weeks with no new injury.\par 3/8/22: 75yo F, known to Dr. Claros, with R knee pain after a fall down stairs on 3/4/22. She was initially seen at the ER and placed in a brace. she has pain in the knee but better than from when she fell but sig pain even with standing \par 4/11/22: RIght knee pain worsening recently, zilretta last time helped a lot.\par 5/9/22: Right knee better since zilretta.  Fell 3 weeks ago and since then having pain from right hip down right leg to foot.  Feels she is having difficulty with balance.\par 8/16/22: Here for follow up R knee. German has been beneficial to her. Would like to repeat today. Orthovisc provided no relief in the past  [] : no [FreeTextEntry1] : right knee [FreeTextEntry5] : pt following up on right knee [FreeTextEntry9] : advil/aleve

## 2022-11-28 NOTE — IMAGING
[de-identified] : RIGHT KNEE\par Mild Effusion\par +Medial joint line tenderness\par +Lateral joint line tenderness\par ROM 0-125\par 5/5 Strength\par Ligamentously stable \par NVI\par Non-antalgic gait w/o assistance \par \par Right shoulder: No swelling.  AROM flex 160, limited IR/ER.  NVI.  Pos impingement.

## 2022-11-28 NOTE — PHYSICAL EXAM
[Right] : right knee [] : ligamentously stable [TWNoteComboBox7] : flexion 125 degrees [de-identified] : extension 0 degrees

## 2022-11-28 NOTE — DISCUSSION/SUMMARY
[de-identified] : The risks, benefits, contents and alternatives to injection were explained in full to the patient.  Risks outlined include but are not limited to infection, sepsis, bleeding, scarring, skin discoloration, temporary increase in pain, syncopal episode, failure to resolve symptoms, allergic reaction, flare reaction, permanent white skin discoloration, symptom recurrence, and elevation of blood sugar in diabetics.  Patient understood the risks.  All questions were answered.  After discussion of options, patient requested an injection.  Oral informed consent was obtained and sterile prep was done of the injection site.  Sterile technique was used to introduce the mixture.  Patient tolerated the procedure well.  Patient advised to ice the injection site this evening.  Signs and symptoms of infection reviewed and patient advised to call immediately for redness, fevers, and/or chills. \par \par Note completed by Abelardo Perez PA-C acting as scribe.\par

## 2022-12-12 ENCOUNTER — APPOINTMENT (OUTPATIENT)
Dept: ORTHOPEDIC SURGERY | Facility: CLINIC | Age: 77
End: 2022-12-12

## 2022-12-12 VITALS — WEIGHT: 128 LBS | BODY MASS INDEX: 21.33 KG/M2 | HEIGHT: 65 IN

## 2022-12-12 DIAGNOSIS — M75.52 BURSITIS OF RIGHT SHOULDER: ICD-10-CM

## 2022-12-12 DIAGNOSIS — M75.51 BURSITIS OF RIGHT SHOULDER: ICD-10-CM

## 2022-12-12 PROCEDURE — 99214 OFFICE O/P EST MOD 30 MIN: CPT | Mod: 25

## 2022-12-12 PROCEDURE — J3490N: CUSTOM

## 2022-12-12 PROCEDURE — 20610 DRAIN/INJ JOINT/BURSA W/O US: CPT

## 2022-12-12 NOTE — IMAGING
[de-identified] : RIGHT KNEE\par Mild Effusion\par +Medial joint line tenderness\par +Lateral joint line tenderness\par ROM 0-125\par 5/5 Strength\par Ligamentously stable \par NVI\par Non-antalgic gait w/o assistance \par \par Right shoulder: No swelling.  AROM flex 160, limited IR/ER.  NVI.  Pos impingement.

## 2022-12-12 NOTE — PHYSICAL EXAM
[Right] : right knee [] : ligamentously stable [TWNoteComboBox7] : flexion 125 degrees [de-identified] : extension 0 degrees

## 2022-12-12 NOTE — HISTORY OF PRESENT ILLNESS
[7] : 7 [5] : 5 [Sharp] : sharp [Occasional] : occasional [Leisure] : leisure [Rest] : rest [Meds] : meds [Ice] : ice [Sitting] : sitting [Walking] : walking [de-identified] : 12/12/22: RIght shoulder and right knee are somewhat better.  Cont left shoulder pain.\par \par Previous doc:\par 7/21/20: Ms. Gertrudis Jay, a 74-year-old female, presents today for right hip and pelvis pain after tripping over her dog on 7/4/20. Known to me for right HARISH in 2018. 1 week s/p ORIF of her right wrist by \par 8/21/20: Pt present for f/u of Closed fx of ramus of right pubis, Pt reports some improvement and more ROM, Only started Ptherapy today.\par 6/28/21: Continued pain in the right knee - Here for HA injections\par 7/12/21: Orthovisc #3 right knee.\par 7/20/21: Orthovisc #4 right knee, no change.\par 08/23/21: s/p completing series of orthovisc injections, feels that she has not yet obtained any relief. Feels that her right knee frequently will buckle , mostly after getting up from any sustained sitting. Overall pain is the same that it had been, now with nocturnal awakening.\par 11/8/21: f/up for R knee. She states the pain returned over the past few weeks with no new injury.\par 3/8/22: 75yo F, known to Dr. Claros, with R knee pain after a fall down stairs on 3/4/22. She was initially seen at the ER and placed in a brace. she has pain in the knee but better than from when she fell but sig pain even with standing \par 4/11/22: RIght knee pain worsening recently, zilretta last time helped a lot.\par 5/9/22: Right knee better since zilretta.  Fell 3 weeks ago and since then having pain from right hip down right leg to foot.  Feels she is having difficulty with balance.\par 8/16/22: Here for follow up R knee. German has been beneficial to her. Would like to repeat today. Orthovisc provided no relief in the past \par 11/28/22: RIght knee pain returned.  B/L shoulder pain as well, right > left. [] : no [FreeTextEntry1] : right knee [FreeTextEntry9] : advil/aleve [FreeTextEntry5] : pt following up on right knee\par 12/12/2022 Ms. ALVIN DOYLE F  here for eval of the right knee. Patient states her right knee is okay but she still has pain in the right. Patient states she gets muscle cramp.\par \par

## 2022-12-12 NOTE — ASSESSMENT
[FreeTextEntry1] : Previous doc:\par X-rays reveal right inferior superior rami fracture and components well fixed in good position. Recommended take 1 81mg of asprin 2x day. to prevent DVT - No restrictions. Activity as tolerated.\par 8/21/20: New Xrays reveal routine healing with callus present, Reports improvement but still with sig pain, PT just starting Ptherapy, Prescribed tramadol for pain relief.\par 6/28/21: Right hip doing well; Continued pain in the right knee - Has had cortisone injection in the past with some relief and would like HA injections today\par 7/12/21: Inj tolerated well. Asp 5cc.\par 7/20/21: Inj tolerated well. Asp 5cc.\par 8/23/21: No relief from orthovisc - cortisone inj today tolerated well.\par 11/8/21: short term relief from [facility] in august. Indicated for Zilretta today to hopefully give her longer relief. Tolerated well. f/up in 3 months.\par 3/8/22: She has sig swelling in the soft tissue and tenderness a- adv OA but no change and no acute fracture - recommend Ice rest and nsaids as needed - if cont pain in a month will try injection\par 4/11/22: Pain returned - zilretta right knee tolerated well.  Asp 10cc clear yellow fluid.\par 5/9/22: Severe degen scoliosis causing sciatic symptoms in right leg.  Planning to see neuro regarding dizziness and balance difficulty.  MDP flexeril and tramadol, PT.  Reeval in 4 weeks.\par 8/16/22: Repeat Zilretta performed today, tolerated well. f/up in 3 months prn\par 11/28/22: Repeat zilretta right knee tolerated well.  RIght shoulder bursitis - prior cuff repair - inj today and if no improvement will send to shoulder specialist.  Left shoulder pain as well, will return in 2 weeks for inj if right shoulder is good and left still hurts.\par \par 12/12/22: Left shoulder injection tolerated well.  If pain persists refer to specialist.

## 2022-12-12 NOTE — DISCUSSION/SUMMARY
[de-identified] : The risks, benefits, contents and alternatives to injection were explained in full to the patient.  Risks outlined include but are not limited to infection, sepsis, bleeding, scarring, skin discoloration, temporary increase in pain, syncopal episode, failure to resolve symptoms, allergic reaction, flare reaction, permanent white skin discoloration, symptom recurrence, and elevation of blood sugar in diabetics.  Patient understood the risks.  All questions were answered.  After discussion of options, patient requested an injection.  Oral informed consent was obtained and sterile prep was done of the injection site.  Sterile technique was used to introduce the mixture.  Patient tolerated the procedure well.  Patient advised to ice the injection site this evening.  Signs and symptoms of infection reviewed and patient advised to call immediately for redness, fevers, and/or chills. \par \par Note completed by Abelardo Perez PA-C acting as scribe.\par

## 2022-12-12 NOTE — PROCEDURE
[Prior failure or difficult injection] : prior failure or difficult injection [Large Joint Injection] : Large joint injection [Subacromial Space] : subacromial space [Pain] : pain [Inflammation] : inflammation [Betadine] : betadine [Ethyl Chloride sprayed topically] : ethyl chloride sprayed topically [Sterile technique used] : sterile technique used [___ cc    6mg] :  Betamethasone (Celestone) ~Vcc of 6mg [___ cc    1%] : Lidocaine ~Vcc of 1%  [___ cc    0.25%] : Bupivacaine (Marcaine) ~Vcc of 0.25%  [] : Patient tolerated procedure well [Call if redness, pain or fever occur] : call if redness, pain or fever occur [Apply ice for 15min out of every hour for the next 12-24 hours as tolerated] : apply ice for 15 minutes out of every hour for the next 12-24 hours as tolerated [Previous OTC use and PT nontherapeutic] : patient has tried OTC's including aspirin, Ibuprofen, Aleve, etc or prescription NSAIDS, and/or exercises at home and/or physical therapy without satisfactory response [Patient had decreased mobility in the joint] : patient had decreased mobility in the joint [Risks, benefits, alternatives discussed / Verbal consent obtained] : the risks benefits, and alternatives have been discussed, and verbal consent was obtained [Left] : of the left

## 2023-02-09 NOTE — PATIENT PROFILE ADULT - NSPROGENARRIVEDFROM_GEN_A_NUR
[Consultation] : a consultation visit [Hypogonadism] : hypogonadism [[employer] ] : provided by [employer]   home

## 2023-03-13 ENCOUNTER — APPOINTMENT (OUTPATIENT)
Dept: ORTHOPEDIC SURGERY | Facility: CLINIC | Age: 78
End: 2023-03-13
Payer: MEDICARE

## 2023-03-13 PROCEDURE — 99214 OFFICE O/P EST MOD 30 MIN: CPT | Mod: 25

## 2023-03-13 PROCEDURE — J3490M: CUSTOM | Mod: NC

## 2023-03-13 PROCEDURE — 20610 DRAIN/INJ JOINT/BURSA W/O US: CPT | Mod: RT

## 2023-03-13 NOTE — PROCEDURE
[Prior failure or difficult injection] : prior failure or difficult injection [Large Joint Injection] : Large joint injection [Left] : of the left [Subacromial Space] : subacromial space [Pain] : pain [Inflammation] : inflammation [Betadine] : betadine [Ethyl Chloride sprayed topically] : ethyl chloride sprayed topically [Sterile technique used] : sterile technique used [___ cc    6mg] :  Betamethasone (Celestone) ~Vcc of 6mg [___ cc    1%] : Lidocaine ~Vcc of 1%  [___ cc    0.25%] : Bupivacaine (Marcaine) ~Vcc of 0.25%  [] : Patient tolerated procedure well [Call if redness, pain or fever occur] : call if redness, pain or fever occur [Apply ice for 15min out of every hour for the next 12-24 hours as tolerated] : apply ice for 15 minutes out of every hour for the next 12-24 hours as tolerated [Previous OTC use and PT nontherapeutic] : patient has tried OTC's including aspirin, Ibuprofen, Aleve, etc or prescription NSAIDS, and/or exercises at home and/or physical therapy without satisfactory response [Patient had decreased mobility in the joint] : patient had decreased mobility in the joint [Risks, benefits, alternatives discussed / Verbal consent obtained] : the risks benefits, and alternatives have been discussed, and verbal consent was obtained [FreeTextEntry3] : Large joint injection was performed on the right knee. The indication for this procedure was pain, inflammation, and x-ray evidence of Osteoarthritis on this or prior visit. The site was prepped with betadine, ethyl chloride sprayed topically, and sterile technique used. An injection of Lidocaine 3cc of 1% , Bupivacaine (Marcaine) 5cc of 0.25% , Methylprednisolone (Depomedrol) cc of 80 mg was used. Patient was advised to call if redness, pain, or fever occur, apply ice for 15 minutes out of every hour for the next 12-24 hours as tolerated and patient was advised to rest the joint(s) for days.\par Patient has tried OTC's including aspirin, Ibuprofen, Aleve, etc or prescription NSAIDS, and/or exercises at home and/or physical therapy without satisfactory response and patient had decreased mobility in the joint.\par

## 2023-03-13 NOTE — DISCUSSION/SUMMARY
[de-identified] : The risks, benefits, contents and alternatives to injection were explained in full to the patient.  Risks outlined include but are not limited to infection, sepsis, bleeding, scarring, skin discoloration, temporary increase in pain, syncopal episode, failure to resolve symptoms, allergic reaction, flare reaction, permanent white skin discoloration, symptom recurrence, and elevation of blood sugar in diabetics.  Patient understood the risks.  All questions were answered.  After discussion of options, patient requested an injection.  Oral informed consent was obtained and sterile prep was done of the injection site.  Sterile technique was used to introduce the mixture.  Patient tolerated the procedure well.  Patient advised to ice the injection site this evening.  Signs and symptoms of infection reviewed and patient advised to call immediately for redness, fevers, and/or chills. \par \par Note completed by Abelardo Perez PA-C acting as scribe.\par

## 2023-03-13 NOTE — IMAGING
[de-identified] : RIGHT KNEE\par Mild Effusion\par +Medial joint line tenderness\par +Lateral joint line tenderness\par ROM 0-125\par 5/5 Strength\par Ligamentously stable \par NVI\par Non-antalgic gait w/o assistance \par \par Right shoulder: No swelling.  AROM flex 160, limited IR/ER.  NVI.  Pos impingement.

## 2023-03-13 NOTE — ASSESSMENT
[FreeTextEntry1] : Previous doc:\par X-rays reveal right inferior superior rami fracture and components well fixed in good position. Recommended take 1 81mg of asprin 2x day. to prevent DVT - No restrictions. Activity as tolerated.\par 8/21/20: New Xrays reveal routine healing with callus present, Reports improvement but still with sig pain, PT just starting Ptherapy, Prescribed tramadol for pain relief.\par 6/28/21: Right hip doing well; Continued pain in the right knee - Has had cortisone injection in the past with some relief and would like HA injections today\par 7/12/21: Inj tolerated well. Asp 5cc.\par 7/20/21: Inj tolerated well. Asp 5cc.\par 8/23/21: No relief from orthovisc - cortisone inj today tolerated well.\par 11/8/21: short term relief from [facility]SI in august. Indicated for Zilretta today to hopefully give her longer relief. Tolerated well. f/up in 3 months.\par 3/8/22: She has sig swelling in the soft tissue and tenderness a- adv OA but no change and no acute fracture - recommend Ice rest and nsaids as needed - if cont pain in a month will try injection\par 4/11/22: Pain returned - zilretta right knee tolerated well.  Asp 10cc clear yellow fluid.\par 5/9/22: Severe degen scoliosis causing sciatic symptoms in right leg.  Planning to see neuro regarding dizziness and balance difficulty.  MDP flexeril and tramadol, PT.  Reeval in 4 weeks.\par 8/16/22: Repeat Zilretta performed today, tolerated well. f/up in 3 months prn\par 11/28/22: Repeat zilretta right knee tolerated well.  RIght shoulder bursitis - prior cuff repair - inj today and if no improvement will send to shoulder specialist.  Left shoulder pain as well, will return in 2 weeks for inj if right shoulder is good and left still hurts.\par 12/12/22: Left shoulder injection tolerated well.  If pain persists refer to specialist.\par \par 3/13/23: Cortisone inj right knee tolerated well.

## 2023-03-13 NOTE — HISTORY OF PRESENT ILLNESS
[7] : 7 [5] : 5 [Sharp] : sharp [Occasional] : occasional [Leisure] : leisure [Rest] : rest [Meds] : meds [Ice] : ice [Sitting] : sitting [Walking] : walking [de-identified] : 3/13/23: Right knee pain returned.\par \par Previous doc:\par 7/21/20: Ms. Gertrudis Jay, a 74-year-old female, presents today for right hip and pelvis pain after tripping over her dog on 7/4/20. Known to me for right HARISH in 2018. 1 week s/p ORIF of her right wrist by \par 8/21/20: Pt present for f/u of Closed fx of ramus of right pubis, Pt reports some improvement and more ROM, Only started Ptherapy today.\par 6/28/21: Continued pain in the right knee - Here for HA injections\par 7/12/21: Orthovisc #3 right knee.\par 7/20/21: Orthovisc #4 right knee, no change.\par 08/23/21: s/p completing series of orthovisc injections, feels that she has not yet obtained any relief. Feels that her right knee frequently will buckle , mostly after getting up from any sustained sitting. Overall pain is the same that it had been, now with nocturnal awakening.\par 11/8/21: f/up for R knee. She states the pain returned over the past few weeks with no new injury.\par 3/8/22: 77yo F, known to Dr. Claros, with R knee pain after a fall down stairs on 3/4/22. She was initially seen at the ER and placed in a brace. she has pain in the knee but better than from when she fell but sig pain even with standing \par 4/11/22: RIght knee pain worsening recently, zilretta last time helped a lot.\par 5/9/22: Right knee better since zilretta.  Fell 3 weeks ago and since then having pain from right hip down right leg to foot.  Feels she is having difficulty with balance.\par 8/16/22: Here for follow up R knee. German has been beneficial to her. Would like to repeat today. Orthovisc provided no relief in the past \par 11/28/22: RIght knee pain returned.  B/L shoulder pain as well, right > left.\par 12/12/22: RIght shoulder and right knee are somewhat better.  Cont left shoulder pain. [] : no [FreeTextEntry1] : right knee [FreeTextEntry5] : pt following up on right knee\par 12/12/2022 Ms. ALVIN DOYLE F  here for eval of the right knee. Patient states her right knee is okay but she still has pain in the right. Patient states she gets muscle cramp.\par \par  [FreeTextEntry9] : advil/aleve

## 2023-03-20 ENCOUNTER — APPOINTMENT (OUTPATIENT)
Dept: ORTHOPEDIC SURGERY | Facility: CLINIC | Age: 78
End: 2023-03-20
Payer: MEDICARE

## 2023-03-20 VITALS — WEIGHT: 122 LBS | BODY MASS INDEX: 20.33 KG/M2 | HEIGHT: 65 IN

## 2023-03-20 DIAGNOSIS — M75.41 IMPINGEMENT SYNDROME OF RIGHT SHOULDER: ICD-10-CM

## 2023-03-20 PROCEDURE — 73030 X-RAY EXAM OF SHOULDER: CPT | Mod: LT

## 2023-03-20 PROCEDURE — 73010 X-RAY EXAM OF SHOULDER BLADE: CPT | Mod: LT

## 2023-03-20 PROCEDURE — 99214 OFFICE O/P EST MOD 30 MIN: CPT

## 2023-03-20 RX ORDER — METHYLPREDNISOLONE 4 MG/1
4 TABLET ORAL
Qty: 1 | Refills: 0 | Status: ACTIVE | COMMUNITY
Start: 2023-03-20 | End: 1900-01-01

## 2023-03-20 NOTE — HISTORY OF PRESENT ILLNESS
[4] : 4 [0] : 0 [Sleep] : sleep [Rest] : rest [Meds] : meds [] : no [FreeTextEntry1] : left shoulder  [FreeTextEntry5] : pt has been having left shoulder pain for a few months now. pt states her right shoulder can bother her at times as well  [FreeTextEntry9] : advil or aleve  [de-identified] : movement  [de-identified] : does her own home exercises for her shoulders

## 2023-03-20 NOTE — CONSULT LETTER
[Dear  ___] : Dear  [unfilled], [Consult Letter:] : I had the pleasure of evaluating your patient, [unfilled]. [Please see my note below.] : Please see my note below. [Consult Closing:] : Thank you very much for allowing me to participate in the care of this patient.  If you have any questions, please do not hesitate to contact me. [Sincerely,] : Sincerely, [FreeTextEntry3] : Regulo Vaughn M.D.\par Shoulder Surgery

## 2023-03-20 NOTE — REASON FOR VISIT
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  She has been treated by Dr. Claros in the past.  She had a L SA injection on 12/12/22 and a R on 11/28/22 with minimal relief.  No formal PT.  Reaching and lifting are affected. Sleep can be affected. She sees Dr. Claros for BL knee arthritis and has had CSIs as well as viscosupplementation. H/O right shoulder surgery.

## 2023-03-20 NOTE — IMAGING
[Left] : left shoulder [FreeTextEntry1] : The AC joint is ok. There is an inferior humeral neck ossicle. There is minimal GH narrowing.  [FreeTextEntry5] : There is a type I - II acromion.

## 2023-03-20 NOTE — PHYSICAL EXAM
[Left] : left shoulder [Moderate] : moderate [4 ___] : forward flexion 4[unfilled]/5 [4___] : external rotation 4[unfilled]/5 [Right] : right shoulder [Sitting] : sitting [Mild] : mild [5 ___] : forward flexion 5[unfilled]/5 [] : no sensory deficits [FreeTextEntry9] : IR to T12. [TWNoteComboBox6] : internal rotation L1 [TWNoteComboBox4] : passive forward flexion 165 degrees [de-identified] : external rotation 60 degrees

## 2023-03-20 NOTE — ASSESSMENT
[FreeTextEntry1] : We discussed the underlying pathology. \par Treatment options reviewed. \par An MRI is indicated. \par MDP is prescribed. \par An injection and PT could be considered. \par Cautions discussed. \par Questions answered. \par \par Patient seen by Regulo Tabares M.D.\par Entered by Ceci Perez acting as scribe.

## 2023-03-21 ENCOUNTER — FORM ENCOUNTER (OUTPATIENT)
Age: 78
End: 2023-03-21

## 2023-03-22 ENCOUNTER — APPOINTMENT (OUTPATIENT)
Dept: MRI IMAGING | Facility: CLINIC | Age: 78
End: 2023-03-22
Payer: MEDICARE

## 2023-03-22 PROCEDURE — 73221 MRI JOINT UPR EXTREM W/O DYE: CPT | Mod: LT,MH

## 2023-03-27 ENCOUNTER — APPOINTMENT (OUTPATIENT)
Dept: ORTHOPEDIC SURGERY | Facility: CLINIC | Age: 78
End: 2023-03-27
Payer: MEDICARE

## 2023-03-27 VITALS — BODY MASS INDEX: 20.33 KG/M2 | WEIGHT: 122 LBS | HEIGHT: 65 IN

## 2023-03-27 DIAGNOSIS — M75.112 INCOMPLETE ROTATOR CUFF TEAR OR RUPTURE OF LEFT SHOULDER, NOT SPECIFIED AS TRAUMATIC: ICD-10-CM

## 2023-03-27 DIAGNOSIS — M75.42 IMPINGEMENT SYNDROME OF LEFT SHOULDER: ICD-10-CM

## 2023-03-27 DIAGNOSIS — M19.012 PRIMARY OSTEOARTHRITIS, LEFT SHOULDER: ICD-10-CM

## 2023-03-27 PROCEDURE — 99214 OFFICE O/P EST MOD 30 MIN: CPT

## 2023-03-27 NOTE — PHYSICAL EXAM
[Left] : left shoulder [Moderate] : moderate [4 ___] : forward flexion 4[unfilled]/5 [4___] : external rotation 4[unfilled]/5 [Right] : right shoulder [Sitting] : sitting [Mild] : mild [5 ___] : forward flexion 5[unfilled]/5 [] : no sensory deficits [FreeTextEntry9] : IR to T12. [TWNoteComboBox6] : internal rotation L1 [TWNoteComboBox4] : passive forward flexion 165 degrees [de-identified] : external rotation 60 degrees

## 2023-03-27 NOTE — ASSESSMENT
[FreeTextEntry1] : We reviewed the MRI findings. \par She will go to PT for one visit to be instructed on HEP. \par She will call if she needs more. \par She will  the MDP.\par We discussed an injection though she wishes to defer. \par Questions answered. \par \par Patient seen by Regulo Tabares M.D.\par Entered by Ceci Perez acting as scribe.

## 2023-03-27 NOTE — HISTORY OF PRESENT ILLNESS
[4] : 4 [0] : 0 [de-identified] : pt is here today for a test follow up for her left shoulder. pt states her pain is similar, gets a burning sensation into her bicep area  [FreeTextEntry1] : left shoulder  [de-identified] : resting

## 2023-03-27 NOTE — DATA REVIEWED
[FreeTextEntry1] : LEFT SHOULDER MRI 3/22/23:\par The MRI compared with 2018 suggests tear progression to 2.2cm. There is GH arthritis with a loose body suggested. There are biceps changes. AC changes are minor.

## 2023-03-27 NOTE — REASON FOR VISIT
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  She has been treated by Dr. Claros in the past.  She had a L SA injection on 12/12/22 and a R on 11/28/22 with minimal relief.  No formal PT.  Reaching and lifting are affected. Sleep can be affected. She sees Dr. Claros for BL knee arthritis and has had CSIs as well as viscosupplementation. H/O right shoulder surgery. She did not take the MDP. The LT shoulder MRI was done 3/22/23.

## 2023-04-17 NOTE — DISCHARGE NOTE ADULT - ADMISSION DATE +STARTOFVISITDATE
Statement Selected [Total (100%)] : total (100%) [Patient] : patient [No Rx restrictions] : No Rx restrictions. [I provided the services listed above] :  I provided the services listed above. [FreeTextEntry1] : poor [FreeTextEntry3] : Degree of impairment with regard to cervical spine only

## 2023-04-18 ENCOUNTER — APPOINTMENT (OUTPATIENT)
Dept: ORTHOPEDIC SURGERY | Facility: CLINIC | Age: 78
End: 2023-04-18
Payer: MEDICARE

## 2023-04-18 DIAGNOSIS — G95.9 DISEASE OF SPINAL CORD, UNSPECIFIED: ICD-10-CM

## 2023-04-18 DIAGNOSIS — S23.9XXA SPRAIN OF UNSPECIFIED PARTS OF THORAX, INITIAL ENCOUNTER: ICD-10-CM

## 2023-04-18 DIAGNOSIS — M54.12 RADICULOPATHY, CERVICAL REGION: ICD-10-CM

## 2023-04-18 DIAGNOSIS — M46.1 SACROILIITIS, NOT ELSEWHERE CLASSIFIED: ICD-10-CM

## 2023-04-18 PROCEDURE — 99213 OFFICE O/P EST LOW 20 MIN: CPT

## 2023-04-18 PROCEDURE — 72070 X-RAY EXAM THORAC SPINE 2VWS: CPT

## 2023-04-18 PROCEDURE — 72050 X-RAY EXAM NECK SPINE 4/5VWS: CPT

## 2023-04-18 PROCEDURE — 72170 X-RAY EXAM OF PELVIS: CPT

## 2023-04-18 PROCEDURE — 72110 X-RAY EXAM L-2 SPINE 4/>VWS: CPT

## 2023-04-18 NOTE — ASSESSMENT
[FreeTextEntry1] : C MRI to eval degree of stenosis, c/f myelopathy\par PT for lumbar and SIJ\par We have discussed the diagnosis cervical myelopathy. We have discussed the step-wise nature of neurologic decline. We have discussed the rationale for surgery for myelopathy, to maintain neurologic function, and that gain of function is not a guarantee. We have discussed monitoring for progression of disease.

## 2023-04-18 NOTE — IMAGING
[de-identified] : CSPINE\par Inspection: No rash or ecchymosis\par Palpation: No spasm in traps, rhomboids, paracervicals\par ROM: limited all planes\par Strength: 5/5 bilateral deltoid, biceps, triceps, wrist flexors, wrist extensors, , abductors\par Sensation: Sensation present to light touch bilateral C5-T1 distributions\par Reflexes: + Livingston's right, - left\par 3+ b/l biceps DTRs, 2+ R BR, 3+ left with inverted response\par Unable to tandem gait\par \par LSPINE\par Inspection: No rash or ecchymosis\par Palpation: limited all planes\par Strength: 5/5 bilateral hip flexors, knee extensors, ankle dorsiflexors, EHL, ankle plantarflexors\par Sensation: Sensation present to light touch bilateral L2-S1 distributions\par Provocative maneuvers: Negative bilateral straight leg raise  [Facet arthropathy] : Facet arthropathy [Scoliosis] : Scoliosis [AP] : anteroposterior [Mild arthritis (Tonnis Grade 1)] : Mild arthritis (Tonnis Grade 1) [Disc space narrowing] : Disc space narrowing [de-identified] : HARISH in place

## 2023-04-18 NOTE — HISTORY OF PRESENT ILLNESS
[10] : 10 [Dull/Aching] : dull/aching [Constant] : constant [de-identified] : 4/18/23-RHDF. Several years of neck pain, radiating across the b/l posterior shouldres. Also has some subjective gait imbalance. Reports some dexterity issues, though attributes this to her b/l thumb surgeries for basal OA. No bb dysfunction. \par Also LBP, up/down spine. No clear pain, N/T down the BLE. \par  [] : no

## 2023-04-19 ENCOUNTER — FORM ENCOUNTER (OUTPATIENT)
Age: 78
End: 2023-04-19

## 2023-04-20 ENCOUNTER — APPOINTMENT (OUTPATIENT)
Dept: MRI IMAGING | Facility: CLINIC | Age: 78
End: 2023-04-20
Payer: MEDICARE

## 2023-04-20 PROCEDURE — 72141 MRI NECK SPINE W/O DYE: CPT | Mod: MH

## 2023-05-01 ENCOUNTER — NON-APPOINTMENT (OUTPATIENT)
Age: 78
End: 2023-05-01

## 2023-05-08 ENCOUNTER — APPOINTMENT (OUTPATIENT)
Dept: ORTHOPEDIC SURGERY | Facility: CLINIC | Age: 78
End: 2023-05-08
Payer: MEDICARE

## 2023-05-08 VITALS — BODY MASS INDEX: 20.33 KG/M2 | WEIGHT: 122 LBS | HEIGHT: 65 IN

## 2023-05-08 DIAGNOSIS — M51.36 OTHER INTERVERTEBRAL DISC DEGENERATION, LUMBAR REGION: ICD-10-CM

## 2023-05-08 PROCEDURE — 73562 X-RAY EXAM OF KNEE 3: CPT | Mod: RT

## 2023-05-08 PROCEDURE — 99214 OFFICE O/P EST MOD 30 MIN: CPT | Mod: 25

## 2023-05-08 PROCEDURE — 20610 DRAIN/INJ JOINT/BURSA W/O US: CPT

## 2023-05-08 NOTE — IMAGING
[Right] : right knee [AP] : anteroposterior [Lateral] : lateral [[place]] : skyline [de-identified] : RIGHT KNEE\par Mild Effusion\par +Medial joint line tenderness\par +Lateral joint line tenderness\par ROM 0-125\par 5/5 Strength\par Ligamentously stable \par NVI\par Non-antalgic gait w/o assistance \par \par Right shoulder: No swelling.  AROM flex 160, limited IR/ER.  NVI.  Pos impingement. [FreeTextEntry9] : advanced OA

## 2023-05-08 NOTE — ASSESSMENT
[FreeTextEntry1] : Previous doc:\par X-rays reveal right inferior superior rami fracture and components well fixed in good position. Recommended take 1 81mg of asprin 2x day. to prevent DVT - No restrictions. Activity as tolerated.\par 8/21/20: New Xrays reveal routine healing with callus present, Reports improvement but still with sig pain, PT just starting Ptherapy, Prescribed tramadol for pain relief.\par 6/28/21: Right hip doing well; Continued pain in the right knee - Has had cortisone injection in the past with some relief and would like HA injections today\par 7/12/21: Inj tolerated well. Asp 5cc.\par 7/20/21: Inj tolerated well. Asp 5cc.\par 8/23/21: No relief from orthovisc - cortisone inj today tolerated well.\par 11/8/21: short term relief from CSI in august. Indicated for Zilretta today to hopefully give her longer relief. Tolerated well. f/up in 3 months.\par 3/8/22: She has sig swelling in the soft tissue and tenderness a- adv OA but no change and no acute fracture - recommend Ice rest and nsaids as needed - if cont pain in a month will try injection\par 4/11/22: Pain returned - zilretta right knee tolerated well.  Asp 10cc clear yellow fluid.\par 5/9/22: Severe degen scoliosis causing sciatic symptoms in right leg.  Planning to see neuro regarding dizziness and balance difficulty.  MDP flexeril and tramadol, PT.  Reeval in 4 weeks.\par 8/16/22: Repeat Zilretta performed today, tolerated well. f/up in 3 months prn\par 11/28/22: Repeat zilretta right knee tolerated well.  RIght shoulder bursitis - prior cuff repair - inj today and if no improvement will send to shoulder specialist.  Left shoulder pain as well, will return in 2 weeks for inj if right shoulder is good and left still hurts.\par 12/12/22: Left shoulder injection tolerated well.  If pain persists refer to specialist.\par 3/13/23: Cortisone inj right knee tolerated well.\par \par 5/8/23: Worsening pain in the right knee, repeat XR shows advanced OA. Will repeat Zilretta today as this has given her good relief in the past. She will plan for R TKA late/after the summer. Risks and benefits discussed and all questions answered. She does have DDD in the lspine, she will start PT for this as well as her knee.

## 2023-05-08 NOTE — HISTORY OF PRESENT ILLNESS
[7] : 7 [5] : 5 [Sharp] : sharp [Occasional] : occasional [Leisure] : leisure [Rest] : rest [Meds] : meds [Ice] : ice [Sitting] : sitting [Walking] : walking [de-identified] : 5/8/23: Worsening pain in the right knee. No relief from previous injection.\par \par Previous doc:\par 7/21/20: Ms. Gertrudis Jay, a 74-year-old female, presents today for right hip and pelvis pain after tripping over her dog on 7/4/20. Known to me for right HARISH in 2018. 1 week s/p ORIF of her right wrist by \par 8/21/20: Pt present for f/u of Closed fx of ramus of right pubis, Pt reports some improvement and more ROM, Only started Ptherapy today.\par 6/28/21: Continued pain in the right knee - Here for HA injections\par 7/12/21: Orthovisc #3 right knee.\par 7/20/21: Orthovisc #4 right knee, no change.\par 08/23/21: s/p completing series of orthovisc injections, feels that she has not yet obtained any relief. Feels that her right knee frequently will buckle , mostly after getting up from any sustained sitting. Overall pain is the same that it had been, now with nocturnal awakening.\par 11/8/21: f/up for R knee. She states the pain returned over the past few weeks with no new injury.\par 3/8/22: 75yo F, known to Dr. Claros, with R knee pain after a fall down stairs on 3/4/22. She was initially seen at the ER and placed in a brace. she has pain in the knee but better than from when she fell but sig pain even with standing \par 4/11/22: RIght knee pain worsening recently, zilretta last time helped a lot.\par 5/9/22: Right knee better since zilretta.  Fell 3 weeks ago and since then having pain from right hip down right leg to foot.  Feels she is having difficulty with balance.\par 8/16/22: Here for follow up R knee. German has been beneficial to her. Would like to repeat today. Orthovisc provided no relief in the past \par 11/28/22: RIght knee pain returned.  B/L shoulder pain as well, right > left.\par 12/12/22: RIght shoulder and right knee are somewhat better.  Cont left shoulder pain.\par 3/13/23: Right knee pain returned. [] : no [FreeTextEntry1] : right knee [FreeTextEntry5] : pt following up on right knee\par 12/12/2022 Ms. ALVIN DOYLE F  here for eval of the right knee. Patient states her right knee is okay but she still has pain in the right. Patient states she gets muscle cramp.\par \par  [FreeTextEntry9] : advil/aleve

## 2023-05-08 NOTE — DISCUSSION/SUMMARY
[de-identified] : The natural progression of Osteoarthritis was explained to the patient. We discussed the possible treatment options from conservative to operative. These included NSAIDS, Glucosamine and Chondrotin sulfate, and Physical Therapy as well different types of injections. We also discussed that at some point they may progress to needed a TKA.  Information and pamphlets were given when appropriate.\par \par Patient Complains of pain in Knee with a level that often reaches greater than a 8/10. The Pain has been progressively worsening of his/her treatment coarse. The pain has interfered with their ADLs and worsens with weight bearing. On exam they often have episodes of swelling/effusion with limited ROM. Pain worsens with ROM passive and active and I can palpate crepitus.\par X-rays were reviewed with the patient and they show joint space narrowing, subchondral sclerosis, osteophyte formation, and subchondral cysts.\par After a period of more than 12 weeks physical therapy or exercise program done with me or another treating physician they have continued pain. The patient has failed a trial of NSAID medication or pain relieves if they were unable to tolerate NSAID medications as well as a series of injection, steroid or Hyaluronic Acid. After a long discussion with the patient both the patient and I have decided we have exhausted all forms of less radical treatments and they would like to proceed with Total Knee Replacement\par \par We discussed my findings and the natural history of their condition. We talked about the details of the proposed surgery and the recovery. We discussed the material risks, possible benefits and alternatives to surgery. The risks include but are not limited to infection, bleeding and possible need for blood transfusion, fracture, bowel blockage, bladder retention or infection, need for reoperation, stiffness and/or limited range of motion, possible damage to nerves and blood vessels, failure of fixation of components, risk of deep vein thromboses and pulmonary embolism, wound healing problems, dislocation, and possible leg length discrepancy. Although incredibly rare, we also discussed the risks of a cardiac event, stroke and even death during, or following, the surgery. We discussed the type of implants the patient will be receiving and the type of fixation that will be used, as well as whether a robot or computer navigation aide will be used. The patient understands they will need medical clearance and will attend a preoperative joint education class. We also discussed the type of anesthesia they will receive, and the risks associated with hospital or rehab length of stay, obesity, diabetes and smoking.\par \par Entered by Rahel Merchant acting as scribe.\par

## 2023-05-08 NOTE — PROCEDURE
[FreeTextEntry3] : Large joint injection was performed on the right knee. The indication for this procedure was pain, inflammation, and x-ray evidence of Osteoarthritis on this or prior visit. The site was prepped with betadine, ethyl chloride sprayed topically, and sterile technique used. An injection of Lidocaine 3cc of 1% , Bupivacaine (Marcaine) 5cc of 0.25% , Methylprednisolone (Depomedrol) cc of 80 mg was used. Patient was advised to call if redness, pain, or fever occur, apply ice for 15 minutes out of every hour for the next 12-24 hours as tolerated and patient was advised to rest the joint(s) for days.\par Patient has tried OTC's including aspirin, Ibuprofen, Aleve, etc or prescription NSAIDS, and/or exercises at home and/or physical therapy without satisfactory response and patient had decreased mobility in the joint.\par

## 2023-05-08 NOTE — PHYSICAL EXAM
[Right] : right knee [] : ligamentously stable [TWNoteComboBox7] : flexion 125 degrees [de-identified] : extension 0 degrees

## 2023-05-09 ENCOUNTER — APPOINTMENT (OUTPATIENT)
Dept: ORTHOPEDIC SURGERY | Facility: CLINIC | Age: 78
End: 2023-05-09
Payer: MEDICARE

## 2023-05-09 DIAGNOSIS — M54.2 CERVICALGIA: ICD-10-CM

## 2023-05-09 PROCEDURE — 99215 OFFICE O/P EST HI 40 MIN: CPT

## 2023-05-09 NOTE — ASSESSMENT
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 Tabares for L shoulder pain

## 2023-05-09 NOTE — HISTORY OF PRESENT ILLNESS
[10] : 10 [Dull/Aching] : dull/aching [Constant] : constant [de-identified] : 4/20/23 Cervical MRI  - report noted in chart. \par Findings: Diffuse loss of disc signal and height. Diffuse anterior spurring and bulging. No fracture.\par C2-C3: No herniation, foraminal stenosis, or central stenosis.\par C3-C4: Broad bulge, spondylotic ridge with broad herniation impressing on the thecal sac with no contact of the \par cord or central stenosis. Luschka hypertrophy and facet hypertrophy with foraminal stenosis left greater than \par right.\par C4-C5: Minor retrolisthesis. Broad bulge, spondylotic ridge, and broad herniation impressing on the thecal sac \par with no central stenosis. Luschka hypertrophy and facet hypertrophy with right foraminal stenosis.\par C5-C6: Broad bulge and spondylotic ridge impressing on thecal sac with no herniation or central stenosis.\par Luschka hypertrophy and facet arthrosis with foraminal stenosis left greater than right.\par C6-C7: Broad bulge, spondylotic ridge, and broad herniation impressing on the thecal sac with borderline leftsided canal stenosis. Luschka hypertrophy and facet arthrosis with foraminal stenosis left greater than right.\par C7-T1: No herniation, foraminal stenosis, or central stenosis.\par \par Ind. 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 Several years of neck pain, radiating across the b/l posterior shouldres. Also has some subjective gait imbalance. Reports some dexterity issues, though attributes this to her b/l thumb surgeries for basal OA. No bb dysfunction. \par Also LBP, up/down spine. No clear pain, N/T down the BLE. \par  [] : no

## 2023-05-09 NOTE — IMAGING
[Facet arthropathy] : Facet arthropathy [Scoliosis] : Scoliosis [AP] : anteroposterior [Mild arthritis (Tonnis Grade 1)] : Mild arthritis (Tonnis Grade 1) [Disc space narrowing] : Disc space narrowing [de-identified] : CSPINE\par Inspection: No rash or ecchymosis\par Palpation: No spasm in traps, rhomboids, paracervicals\par ROM: limited all planes\par Strength: 5/5 bilateral deltoid, biceps, triceps, wrist flexors, wrist extensors, , abductors\par Sensation: Sensation present to light touch bilateral C5-T1 distributions\par Reflexes: + Livingston's right, - left\par 3+ b/l biceps DTRs, 2+ R BR, 3+ left with inverted response\par Unable to tandem gait\par \par LSPINE\par Inspection: No rash or ecchymosis\par Palpation: limited all planes\par Strength: 5/5 bilateral hip flexors, knee extensors, ankle dorsiflexors, EHL, ankle plantarflexors\par Sensation: Sensation present to light touch bilateral L2-S1 distributions\par Provocative maneuvers: Negative bilateral straight leg raise  [de-identified] : HARISH in place

## 2023-05-22 ENCOUNTER — APPOINTMENT (OUTPATIENT)
Dept: ORTHOPEDIC SURGERY | Facility: CLINIC | Age: 78
End: 2023-05-22

## 2023-08-14 ENCOUNTER — APPOINTMENT (OUTPATIENT)
Dept: ORTHOPEDIC SURGERY | Facility: CLINIC | Age: 78
End: 2023-08-14
Payer: MEDICARE

## 2023-08-14 PROCEDURE — 99214 OFFICE O/P EST MOD 30 MIN: CPT | Mod: 25

## 2023-08-14 PROCEDURE — 20610 DRAIN/INJ JOINT/BURSA W/O US: CPT | Mod: RT

## 2023-08-14 NOTE — IMAGING
[Right] : right knee [AP] : anteroposterior [Lateral] : lateral [[place]] : skyline [de-identified] : RIGHT KNEE Mild Effusion +Medial joint line tenderness +Lateral joint line tenderness ROM 0-125 5/5 Strength Ligamentously stable  NVI Non-antalgic gait w/o assistance  [FreeTextEntry9] : advanced OA

## 2023-08-14 NOTE — ASSESSMENT
[FreeTextEntry1] : Previous doc: X-rays reveal right inferior superior rami fracture and components well fixed in good position. Recommended take 1 81mg of asprin 2x day. to prevent DVT - No restrictions. Activity as tolerated. 8/21/20: New Xrays reveal routine healing with callus present, Reports improvement but still with sig pain, PT just starting Ptherapy, Prescribed tramadol for pain relief. 6/28/21: Right hip doing well; Continued pain in the right knee - Has had cortisone injection in the past with some relief and would like HA injections today 7/12/21: Inj tolerated well. Asp 5cc. 7/20/21: Inj tolerated well. Asp 5cc. 8/23/21: No relief from orthovisc - cortisone inj today tolerated well. 11/8/21: short term relief from CSI in august. Indicated for Zilretta today to hopefully give her longer relief. Tolerated well. f/up in 3 months. 3/8/22: She has sig swelling in the soft tissue and tenderness a- adv OA but no change and no acute fracture - recommend Ice rest and nsaids as needed - if cont pain in a month will try injection 4/11/22: Pain returned - zilretta right knee tolerated well.  Asp 10cc clear yellow fluid. 5/9/22: Severe degen scoliosis causing sciatic symptoms in right leg.  Planning to see neuro regarding dizziness and balance difficulty.  MDP flexeril and tramadol, PT.  Reeval in 4 weeks. 8/16/22: Repeat Zilretta performed today, tolerated well. f/up in 3 months prn 11/28/22: Repeat zilretta right knee tolerated well.  RIght shoulder bursitis - prior cuff repair - inj today and if no improvement will send to shoulder specialist.  Left shoulder pain as well, will return in 2 weeks for inj if right shoulder is good and left still hurts. 12/12/22: Left shoulder injection tolerated well.  If pain persists refer to specialist. 3/13/23: Cortisone inj right knee tolerated well. 5/8/23: Worsening pain in the right knee, repeat XR shows advanced OA. Will repeat Zilretta today as this has given her good relief in the past. She will plan for R TKA late/after the summer. Risks and benefits discussed and all questions answered. She does have DDD in the lspine, she will start PT for this as well as her knee.   8/14/23: Repeat zilretta right knee tolerated well.

## 2023-08-14 NOTE — HISTORY OF PRESENT ILLNESS
[7] : 7 [5] : 5 [Sharp] : sharp [Occasional] : occasional [Leisure] : leisure [Rest] : rest [Meds] : meds [Ice] : ice [Sitting] : sitting [Walking] : walking [de-identified] : 8/14/23: Inj helped a lot but pain returned.  Helped for about 3 months.  Previous doc: 7/21/20: Ms. Gertrudis Jay, a 74-year-old female, presents today for right hip and pelvis pain after tripping over her dog on 7/4/20. Known to me for right HARISH in 2018. 1 week s/p ORIF of her right wrist by  8/21/20: Pt present for f/u of Closed fx of ramus of right pubis, Pt reports some improvement and more ROM, Only started Ptherapy today. 6/28/21: Continued pain in the right knee - Here for HA injections 7/12/21: Orthovisc #3 right knee. 7/20/21: Orthovisc #4 right knee, no change. 08/23/21: s/p completing series of orthovisc injections, feels that she has not yet obtained any relief. Feels that her right knee frequently will buckle , mostly after getting up from any sustained sitting. Overall pain is the same that it had been, now with nocturnal awakening. 11/8/21: f/up for R knee. She states the pain returned over the past few weeks with no new injury. 3/8/22: 77yo F, known to Dr. Claros, with R knee pain after a fall down stairs on 3/4/22. She was initially seen at the ER and placed in a brace. she has pain in the knee but better than from when she fell but sig pain even with standing  4/11/22: RIght knee pain worsening recently, zilretta last time helped a lot. 5/9/22: Right knee better since zilretta.  Fell 3 weeks ago and since then having pain from right hip down right leg to foot.  Feels she is having difficulty with balance. 8/16/22: Here for follow up R knee. German has been beneficial to her. Would like to repeat today. Orthovisc provided no relief in the past  11/28/22: RIght knee pain returned.  B/L shoulder pain as well, right > left. 12/12/22: RIght shoulder and right knee are somewhat better.  Cont left shoulder pain. 3/13/23: Right knee pain returned. 5/8/23: Worsening pain in the right knee. No relief from previous injection. [] : no [FreeTextEntry1] : right knee [FreeTextEntry5] : pt following up on right knee\par  12/12/2022 Ms. ALVIN DOYLE F  here for eval of the right knee. Patient states her right knee is okay but she still has pain in the right. Patient states she gets muscle cramp.\par  \par   [FreeTextEntry9] : advil/aleve medical staff/family/Caregiver

## 2023-08-14 NOTE — DISCUSSION/SUMMARY
[de-identified] : The natural progression of Osteoarthritis was explained to the patient. We discussed the possible treatment options from conservative to operative. These included NSAIDS, Glucosamine and Chondrotin sulfate, and Physical Therapy as well different types of injections. We also discussed that at some point they may progress to needed a TKA.  Information and pamphlets were given when appropriate.\par  \par  Patient Complains of pain in Knee with a level that often reaches greater than a 8/10. The Pain has been progressively worsening of his/her treatment coarse. The pain has interfered with their ADLs and worsens with weight bearing. On exam they often have episodes of swelling/effusion with limited ROM. Pain worsens with ROM passive and active and I can palpate crepitus.\par  X-rays were reviewed with the patient and they show joint space narrowing, subchondral sclerosis, osteophyte formation, and subchondral cysts.\par  After a period of more than 12 weeks physical therapy or exercise program done with me or another treating physician they have continued pain. The patient has failed a trial of NSAID medication or pain relieves if they were unable to tolerate NSAID medications as well as a series of injection, steroid or Hyaluronic Acid. After a long discussion with the patient both the patient and I have decided we have exhausted all forms of less radical treatments and they would like to proceed with Total Knee Replacement\par  \par  We discussed my findings and the natural history of their condition. We talked about the details of the proposed surgery and the recovery. We discussed the material risks, possible benefits and alternatives to surgery. The risks include but are not limited to infection, bleeding and possible need for blood transfusion, fracture, bowel blockage, bladder retention or infection, need for reoperation, stiffness and/or limited range of motion, possible damage to nerves and blood vessels, failure of fixation of components, risk of deep vein thromboses and pulmonary embolism, wound healing problems, dislocation, and possible leg length discrepancy. Although incredibly rare, we also discussed the risks of a cardiac event, stroke and even death during, or following, the surgery. We discussed the type of implants the patient will be receiving and the type of fixation that will be used, as well as whether a robot or computer navigation aide will be used. The patient understands they will need medical clearance and will attend a preoperative joint education class. We also discussed the type of anesthesia they will receive, and the risks associated with hospital or rehab length of stay, obesity, diabetes and smoking.\par  \par  Entered by Rahel Merchant acting as scribe.\par

## 2023-10-09 ENCOUNTER — APPOINTMENT (OUTPATIENT)
Dept: ORTHOPEDIC SURGERY | Facility: CLINIC | Age: 78
End: 2023-10-09
Payer: MEDICARE

## 2023-10-09 VITALS — HEIGHT: 65 IN | WEIGHT: 122 LBS | BODY MASS INDEX: 20.33 KG/M2

## 2023-10-09 PROCEDURE — 99214 OFFICE O/P EST MOD 30 MIN: CPT

## 2023-10-17 ENCOUNTER — APPOINTMENT (OUTPATIENT)
Dept: ORTHOPEDIC SURGERY | Facility: CLINIC | Age: 78
End: 2023-10-17
Payer: MEDICARE

## 2023-10-17 VITALS — BODY MASS INDEX: 20.33 KG/M2 | WEIGHT: 122 LBS | HEIGHT: 65 IN

## 2023-10-17 PROCEDURE — 99214 OFFICE O/P EST MOD 30 MIN: CPT

## 2023-10-17 RX ORDER — DICLOFENAC SODIUM 1% 10 MG/G
1 GEL TOPICAL DAILY
Qty: 3 | Refills: 0 | Status: ACTIVE | COMMUNITY
Start: 2023-10-17 | End: 1900-01-01

## 2023-11-27 ENCOUNTER — APPOINTMENT (OUTPATIENT)
Dept: ORTHOPEDIC SURGERY | Facility: CLINIC | Age: 78
End: 2023-11-27
Payer: MEDICARE

## 2023-11-27 VITALS — BODY MASS INDEX: 20.33 KG/M2 | HEIGHT: 65 IN | WEIGHT: 122 LBS

## 2023-11-27 PROCEDURE — 99214 OFFICE O/P EST MOD 30 MIN: CPT

## 2023-11-27 RX ORDER — OXYCODONE 5 MG/1
5 TABLET ORAL TWICE DAILY
Qty: 30 | Refills: 0 | Status: ACTIVE | COMMUNITY
Start: 2023-11-27 | End: 1900-01-01

## 2023-12-14 ENCOUNTER — APPOINTMENT (OUTPATIENT)
Dept: CT IMAGING | Facility: CLINIC | Age: 78
End: 2023-12-14

## 2023-12-14 PROCEDURE — 73700 CT LOWER EXTREMITY W/O DYE: CPT | Mod: RT,MH

## 2023-12-22 ENCOUNTER — OUTPATIENT (OUTPATIENT)
Dept: OUTPATIENT SERVICES | Facility: HOSPITAL | Age: 78
LOS: 1 days | Discharge: HOME | End: 2023-12-22
Payer: MEDICARE

## 2023-12-22 VITALS
HEART RATE: 71 BPM | DIASTOLIC BLOOD PRESSURE: 85 MMHG | TEMPERATURE: 98 F | WEIGHT: 133.38 LBS | RESPIRATION RATE: 17 BRPM | HEIGHT: 66.5 IN | OXYGEN SATURATION: 96 % | SYSTOLIC BLOOD PRESSURE: 159 MMHG

## 2023-12-22 DIAGNOSIS — F41.9 ANXIETY DISORDER, UNSPECIFIED: ICD-10-CM

## 2023-12-22 DIAGNOSIS — M17.11 UNILATERAL PRIMARY OSTEOARTHRITIS, RIGHT KNEE: ICD-10-CM

## 2023-12-22 DIAGNOSIS — Z98.890 OTHER SPECIFIED POSTPROCEDURAL STATES: Chronic | ICD-10-CM

## 2023-12-22 DIAGNOSIS — Z01.818 ENCOUNTER FOR OTHER PREPROCEDURAL EXAMINATION: ICD-10-CM

## 2023-12-22 DIAGNOSIS — Z96.641 PRESENCE OF RIGHT ARTIFICIAL HIP JOINT: Chronic | ICD-10-CM

## 2023-12-22 LAB
A1C WITH ESTIMATED AVERAGE GLUCOSE RESULT: 5.6 % — SIGNIFICANT CHANGE UP (ref 4–5.6)
A1C WITH ESTIMATED AVERAGE GLUCOSE RESULT: 5.6 % — SIGNIFICANT CHANGE UP (ref 4–5.6)
ALBUMIN SERPL ELPH-MCNC: 3.5 G/DL — SIGNIFICANT CHANGE UP (ref 3.3–5)
ALBUMIN SERPL ELPH-MCNC: 3.5 G/DL — SIGNIFICANT CHANGE UP (ref 3.3–5)
ALP SERPL-CCNC: 78 U/L — SIGNIFICANT CHANGE UP (ref 40–120)
ALP SERPL-CCNC: 78 U/L — SIGNIFICANT CHANGE UP (ref 40–120)
ALT FLD-CCNC: 23 U/L — SIGNIFICANT CHANGE UP (ref 12–78)
ALT FLD-CCNC: 23 U/L — SIGNIFICANT CHANGE UP (ref 12–78)
ANION GAP SERPL CALC-SCNC: 8 MMOL/L — SIGNIFICANT CHANGE UP (ref 5–17)
ANION GAP SERPL CALC-SCNC: 8 MMOL/L — SIGNIFICANT CHANGE UP (ref 5–17)
APTT BLD: 35.6 SEC — SIGNIFICANT CHANGE UP (ref 24.5–35.6)
APTT BLD: 35.6 SEC — SIGNIFICANT CHANGE UP (ref 24.5–35.6)
AST SERPL-CCNC: 23 U/L — SIGNIFICANT CHANGE UP (ref 15–37)
AST SERPL-CCNC: 23 U/L — SIGNIFICANT CHANGE UP (ref 15–37)
BASOPHILS # BLD AUTO: 0.03 K/UL — SIGNIFICANT CHANGE UP (ref 0–0.2)
BASOPHILS # BLD AUTO: 0.03 K/UL — SIGNIFICANT CHANGE UP (ref 0–0.2)
BASOPHILS NFR BLD AUTO: 0.6 % — SIGNIFICANT CHANGE UP (ref 0–2)
BASOPHILS NFR BLD AUTO: 0.6 % — SIGNIFICANT CHANGE UP (ref 0–2)
BILIRUB SERPL-MCNC: 0.5 MG/DL — SIGNIFICANT CHANGE UP (ref 0.2–1.2)
BILIRUB SERPL-MCNC: 0.5 MG/DL — SIGNIFICANT CHANGE UP (ref 0.2–1.2)
BLD GP AB SCN SERPL QL: SIGNIFICANT CHANGE UP
BLD GP AB SCN SERPL QL: SIGNIFICANT CHANGE UP
BUN SERPL-MCNC: 16 MG/DL — SIGNIFICANT CHANGE UP (ref 7–23)
BUN SERPL-MCNC: 16 MG/DL — SIGNIFICANT CHANGE UP (ref 7–23)
CALCIUM SERPL-MCNC: 9.1 MG/DL — SIGNIFICANT CHANGE UP (ref 8.5–10.1)
CALCIUM SERPL-MCNC: 9.1 MG/DL — SIGNIFICANT CHANGE UP (ref 8.5–10.1)
CHLORIDE SERPL-SCNC: 108 MMOL/L — SIGNIFICANT CHANGE UP (ref 96–108)
CHLORIDE SERPL-SCNC: 108 MMOL/L — SIGNIFICANT CHANGE UP (ref 96–108)
CO2 SERPL-SCNC: 27 MMOL/L — SIGNIFICANT CHANGE UP (ref 22–31)
CO2 SERPL-SCNC: 27 MMOL/L — SIGNIFICANT CHANGE UP (ref 22–31)
CREAT SERPL-MCNC: 0.91 MG/DL — SIGNIFICANT CHANGE UP (ref 0.5–1.3)
CREAT SERPL-MCNC: 0.91 MG/DL — SIGNIFICANT CHANGE UP (ref 0.5–1.3)
EGFR: 64 ML/MIN/1.73M2 — SIGNIFICANT CHANGE UP
EGFR: 64 ML/MIN/1.73M2 — SIGNIFICANT CHANGE UP
EOSINOPHIL # BLD AUTO: 0.12 K/UL — SIGNIFICANT CHANGE UP (ref 0–0.5)
EOSINOPHIL # BLD AUTO: 0.12 K/UL — SIGNIFICANT CHANGE UP (ref 0–0.5)
EOSINOPHIL NFR BLD AUTO: 2.4 % — SIGNIFICANT CHANGE UP (ref 0–6)
EOSINOPHIL NFR BLD AUTO: 2.4 % — SIGNIFICANT CHANGE UP (ref 0–6)
ESTIMATED AVERAGE GLUCOSE: 114 MG/DL — SIGNIFICANT CHANGE UP (ref 68–114)
ESTIMATED AVERAGE GLUCOSE: 114 MG/DL — SIGNIFICANT CHANGE UP (ref 68–114)
GLUCOSE SERPL-MCNC: 93 MG/DL — SIGNIFICANT CHANGE UP (ref 70–99)
GLUCOSE SERPL-MCNC: 93 MG/DL — SIGNIFICANT CHANGE UP (ref 70–99)
HCT VFR BLD CALC: 43.6 % — SIGNIFICANT CHANGE UP (ref 34.5–45)
HCT VFR BLD CALC: 43.6 % — SIGNIFICANT CHANGE UP (ref 34.5–45)
HGB BLD-MCNC: 14.1 G/DL — SIGNIFICANT CHANGE UP (ref 11.5–15.5)
HGB BLD-MCNC: 14.1 G/DL — SIGNIFICANT CHANGE UP (ref 11.5–15.5)
IMM GRANULOCYTES NFR BLD AUTO: 0.4 % — SIGNIFICANT CHANGE UP (ref 0–0.9)
IMM GRANULOCYTES NFR BLD AUTO: 0.4 % — SIGNIFICANT CHANGE UP (ref 0–0.9)
INR BLD: 0.85 RATIO — SIGNIFICANT CHANGE UP (ref 0.85–1.18)
INR BLD: 0.85 RATIO — SIGNIFICANT CHANGE UP (ref 0.85–1.18)
LYMPHOCYTES # BLD AUTO: 1.12 K/UL — SIGNIFICANT CHANGE UP (ref 1–3.3)
LYMPHOCYTES # BLD AUTO: 1.12 K/UL — SIGNIFICANT CHANGE UP (ref 1–3.3)
LYMPHOCYTES # BLD AUTO: 22.3 % — SIGNIFICANT CHANGE UP (ref 13–44)
LYMPHOCYTES # BLD AUTO: 22.3 % — SIGNIFICANT CHANGE UP (ref 13–44)
MCHC RBC-ENTMCNC: 28 PG — SIGNIFICANT CHANGE UP (ref 27–34)
MCHC RBC-ENTMCNC: 28 PG — SIGNIFICANT CHANGE UP (ref 27–34)
MCHC RBC-ENTMCNC: 32.3 G/DL — SIGNIFICANT CHANGE UP (ref 32–36)
MCHC RBC-ENTMCNC: 32.3 G/DL — SIGNIFICANT CHANGE UP (ref 32–36)
MCV RBC AUTO: 86.5 FL — SIGNIFICANT CHANGE UP (ref 80–100)
MCV RBC AUTO: 86.5 FL — SIGNIFICANT CHANGE UP (ref 80–100)
MONOCYTES # BLD AUTO: 0.48 K/UL — SIGNIFICANT CHANGE UP (ref 0–0.9)
MONOCYTES # BLD AUTO: 0.48 K/UL — SIGNIFICANT CHANGE UP (ref 0–0.9)
MONOCYTES NFR BLD AUTO: 9.6 % — SIGNIFICANT CHANGE UP (ref 2–14)
MONOCYTES NFR BLD AUTO: 9.6 % — SIGNIFICANT CHANGE UP (ref 2–14)
MRSA PCR RESULT.: SIGNIFICANT CHANGE UP
MRSA PCR RESULT.: SIGNIFICANT CHANGE UP
NEUTROPHILS # BLD AUTO: 3.25 K/UL — SIGNIFICANT CHANGE UP (ref 1.8–7.4)
NEUTROPHILS # BLD AUTO: 3.25 K/UL — SIGNIFICANT CHANGE UP (ref 1.8–7.4)
NEUTROPHILS NFR BLD AUTO: 64.7 % — SIGNIFICANT CHANGE UP (ref 43–77)
NEUTROPHILS NFR BLD AUTO: 64.7 % — SIGNIFICANT CHANGE UP (ref 43–77)
NRBC # BLD: 0 /100 WBCS — SIGNIFICANT CHANGE UP (ref 0–0)
NRBC # BLD: 0 /100 WBCS — SIGNIFICANT CHANGE UP (ref 0–0)
PLATELET # BLD AUTO: 286 K/UL — SIGNIFICANT CHANGE UP (ref 150–400)
PLATELET # BLD AUTO: 286 K/UL — SIGNIFICANT CHANGE UP (ref 150–400)
POTASSIUM SERPL-MCNC: 4 MMOL/L — SIGNIFICANT CHANGE UP (ref 3.5–5.3)
POTASSIUM SERPL-MCNC: 4 MMOL/L — SIGNIFICANT CHANGE UP (ref 3.5–5.3)
POTASSIUM SERPL-SCNC: 4 MMOL/L — SIGNIFICANT CHANGE UP (ref 3.5–5.3)
POTASSIUM SERPL-SCNC: 4 MMOL/L — SIGNIFICANT CHANGE UP (ref 3.5–5.3)
PROT SERPL-MCNC: 7.1 GM/DL — SIGNIFICANT CHANGE UP (ref 6–8.3)
PROT SERPL-MCNC: 7.1 GM/DL — SIGNIFICANT CHANGE UP (ref 6–8.3)
PROTHROM AB SERPL-ACNC: 10.2 SEC — SIGNIFICANT CHANGE UP (ref 9.5–13)
PROTHROM AB SERPL-ACNC: 10.2 SEC — SIGNIFICANT CHANGE UP (ref 9.5–13)
RBC # BLD: 5.04 M/UL — SIGNIFICANT CHANGE UP (ref 3.8–5.2)
RBC # BLD: 5.04 M/UL — SIGNIFICANT CHANGE UP (ref 3.8–5.2)
RBC # FLD: 14.4 % — SIGNIFICANT CHANGE UP (ref 10.3–14.5)
RBC # FLD: 14.4 % — SIGNIFICANT CHANGE UP (ref 10.3–14.5)
S AUREUS DNA NOSE QL NAA+PROBE: SIGNIFICANT CHANGE UP
S AUREUS DNA NOSE QL NAA+PROBE: SIGNIFICANT CHANGE UP
SODIUM SERPL-SCNC: 143 MMOL/L — SIGNIFICANT CHANGE UP (ref 135–145)
SODIUM SERPL-SCNC: 143 MMOL/L — SIGNIFICANT CHANGE UP (ref 135–145)
WBC # BLD: 5.02 K/UL — SIGNIFICANT CHANGE UP (ref 3.8–10.5)
WBC # BLD: 5.02 K/UL — SIGNIFICANT CHANGE UP (ref 3.8–10.5)
WBC # FLD AUTO: 5.02 K/UL — SIGNIFICANT CHANGE UP (ref 3.8–10.5)
WBC # FLD AUTO: 5.02 K/UL — SIGNIFICANT CHANGE UP (ref 3.8–10.5)

## 2023-12-22 PROCEDURE — 93010 ELECTROCARDIOGRAM REPORT: CPT

## 2023-12-22 RX ORDER — DULOXETINE HYDROCHLORIDE 30 MG/1
30 CAPSULE, DELAYED RELEASE ORAL
Qty: 0 | Refills: 0 | DISCHARGE

## 2023-12-22 RX ORDER — SODIUM CHLORIDE 9 MG/ML
3 INJECTION INTRAMUSCULAR; INTRAVENOUS; SUBCUTANEOUS EVERY 8 HOURS
Refills: 0 | Status: DISCONTINUED | OUTPATIENT
Start: 2024-01-11 | End: 2024-01-11

## 2023-12-22 NOTE — OCCUPATIONAL THERAPY INITIAL EVALUATION ADULT - ADDITIONAL COMMENTS
Pt reports she lives with 2 grandchildren in a private house with 2 platform steps to enter (can fit rolling walker) & 13 steps with bilateral close rails to navigate inside. Pt has a walk-in shower, retractable shower head & standard toilet. Pt is independent with ADls and mobility. Pt has increased pain with activity & takes Tylenol for pain relief. Pt has no recent PT, no falls and has leg buckling. Pt wears glasses, no hearing aids, drives & is right handed. Pt has a PMh of right HARISH about 2 years ago.

## 2023-12-22 NOTE — H&P PST ADULT - PROBLEM SELECTOR PLAN 1
robotic assisted right total knee arthroplasty  labs - cbc,pt/ptt,bmp,t&s,nose cx,ekg  M/C required  preop 3 day hibiclens instruction reviewed and given .instructed on if  nose cx positive use mupuricin 5 days and checklist given  take routine meds DOS with sips of water. avoid NSAID and OTC supplements. verbalized understanding  information on proper nutrition , increase protein and better food choices provided in packet   ensure clear   Anesthesiologist to review PST labs, EKG, required clearances and optimization for surgery.

## 2023-12-22 NOTE — H&P PST ADULT - ASSESSMENT
78F pmh depression, Hepatitis B (1970), c/o right knee pain 2/2 unilateral primary ostearthritis here for PsT for scheduled robotic assisted right total knee arthroplasty with Dr. Claros on 24  CAPRINI SCORE    AGE RELATED RISK FACTORS                                                       MOBILITY RELATED FACTORS  [ ] Age 41-60 years                                            (1 Point)                  [ ] Bed rest                                                        (1 Point)  [ ] Age: 61-74 years                                           (2 Points)                [ ] Plaster cast                                                   (2 Points)  [ x] Age= 75 years                                              (3 Points)                 [ ] Bed bound for more than 72 hours                   (2 Points)    DISEASE RELATED RISK FACTORS                                               GENDER SPECIFIC FACTORS  [ x] Edema in the lower extremities                       (1 Point)                  [ ] Pregnancy                                                     (1 Point)  [ ] Varicose veins                                               (1 Point)                  [ ] Post-partum < 6 weeks                                   (1 Point)             [ ] BMI > 25 Kg/m2                                            (1 Point)                  [ ] Hormonal therapy  or oral contraception            (1 Point)                 [ ] Sepsis (in the previous month)                        (1 Point)                  [ ] History of pregnancy complications  [ ] Pneumonia or serious lung disease                                               [ ] Unexplained or recurrent                       (1 Point)           (in the previous month)                               (1 Point)  [ ] Abnormal pulmonary function test                     (1 Point)                 SURGERY RELATED RISK FACTORS  [ ] Acute myocardial infarction                              (1 Point)                 [ ]  Section                                            (1 Point)  [ ] Congestive heart failure (in the previous month)  (1 Point)                 [ ] Minor surgery                                                 (1 Point)   [ ] Inflammatory bowel disease                             (1 Point)                 [ ] Arthroscopic surgery                                        (2 Points)  [ ] Central venous access                                    (2 Points)                [ ] General surgery lasting more than 45 minutes   (2 Points)       [ ] Stroke (in the previous month)                          (5 Points)               [ x] Elective arthroplasty                                        (5 Points)                                                                                                                                               HEMATOLOGY RELATED FACTORS                                                 TRAUMA RELATED RISK FACTORS  [ ] Prior episodes of VTE                                     (3 Points)                 [ ] Fracture of the hip, pelvis, or leg                       (5 Points)  [ ] Positive family history for VTE                         (3 Points)                 [ ] Acute spinal cord injury (in the previous month)  (5 Points)  [ ] Prothrombin 08508 A                                      (3 Points)                 [ ] Paralysis  (less than 1 month)                          (5 Points)  [ ] Factor V Leiden                                             (3 Points)                 [ ] Multiple Trauma within 1 month                         (5 Points)  [ ] Lupus anticoagulants                                     (3 Points)                                                           [ ] Anticardiolipin antibodies                                (3 Points)                                                       [ ] High homocysteine in the blood                      (3 Points)                                             [ ] Other congenital or acquired thrombophilia       (3 Points)                                                [ ] Heparin induced thrombocytopenia                  (3 Points)                                          Total Score [     9     ] 78F pmh depression, Hepatitis B (1970), c/o right knee pain 2/2 unilateral primary ostearthritis here for PsT for scheduled robotic assisted right total knee arthroplasty with Dr. Claros on 24  CAPRINI SCORE    AGE RELATED RISK FACTORS                                                       MOBILITY RELATED FACTORS  [ ] Age 41-60 years                                            (1 Point)                  [ ] Bed rest                                                        (1 Point)  [ ] Age: 61-74 years                                           (2 Points)                [ ] Plaster cast                                                   (2 Points)  [ x] Age= 75 years                                              (3 Points)                 [ ] Bed bound for more than 72 hours                   (2 Points)    DISEASE RELATED RISK FACTORS                                               GENDER SPECIFIC FACTORS  [ x] Edema in the lower extremities                       (1 Point)                  [ ] Pregnancy                                                     (1 Point)  [ ] Varicose veins                                               (1 Point)                  [ ] Post-partum < 6 weeks                                   (1 Point)             [ ] BMI > 25 Kg/m2                                            (1 Point)                  [ ] Hormonal therapy  or oral contraception            (1 Point)                 [ ] Sepsis (in the previous month)                        (1 Point)                  [ ] History of pregnancy complications  [ ] Pneumonia or serious lung disease                                               [ ] Unexplained or recurrent                       (1 Point)           (in the previous month)                               (1 Point)  [ ] Abnormal pulmonary function test                     (1 Point)                 SURGERY RELATED RISK FACTORS  [ ] Acute myocardial infarction                              (1 Point)                 [ ]  Section                                            (1 Point)  [ ] Congestive heart failure (in the previous month)  (1 Point)                 [ ] Minor surgery                                                 (1 Point)   [ ] Inflammatory bowel disease                             (1 Point)                 [ ] Arthroscopic surgery                                        (2 Points)  [ ] Central venous access                                    (2 Points)                [ ] General surgery lasting more than 45 minutes   (2 Points)       [ ] Stroke (in the previous month)                          (5 Points)               [ x] Elective arthroplasty                                        (5 Points)                                                                                                                                               HEMATOLOGY RELATED FACTORS                                                 TRAUMA RELATED RISK FACTORS  [ ] Prior episodes of VTE                                     (3 Points)                 [ ] Fracture of the hip, pelvis, or leg                       (5 Points)  [ ] Positive family history for VTE                         (3 Points)                 [ ] Acute spinal cord injury (in the previous month)  (5 Points)  [ ] Prothrombin 85309 A                                      (3 Points)                 [ ] Paralysis  (less than 1 month)                          (5 Points)  [ ] Factor V Leiden                                             (3 Points)                 [ ] Multiple Trauma within 1 month                         (5 Points)  [ ] Lupus anticoagulants                                     (3 Points)                                                           [ ] Anticardiolipin antibodies                                (3 Points)                                                       [ ] High homocysteine in the blood                      (3 Points)                                             [ ] Other congenital or acquired thrombophilia       (3 Points)                                                [ ] Heparin induced thrombocytopenia                  (3 Points)                                          Total Score [     9     ]

## 2023-12-22 NOTE — H&P PST ADULT - MAMMOGRAM, LAST, PROFILE
Nutrition Problem #1: Inadequate oral intake  Intervention: Food and/or Nutrient Delivery: Continue NPO, Start Tube Feeding  Nutritional Goals:  Tolerate enteral nutrition at goal rate without any GI distress 2016

## 2023-12-22 NOTE — H&P PST ADULT - HISTORY OF PRESENT ILLNESS
.........78F pmh depression, arthritis c/o right hip pain found to have primary osteoarthritis of right hip here for PST for scheduled Right total hip arthroplasty 78F pmh depression, Hepatitis B (1970), c/o right knee pain 2/2 unilateral primary ostearthritis here for PsT for scheduled robotic assisted right total knee arthroplasty with Dr. Claros on 1-11-24

## 2023-12-22 NOTE — OCCUPATIONAL THERAPY INITIAL EVALUATION ADULT - PERTINENT HX OF CURRENT PROBLEM, REHAB EVAL
Pain and OA right knee. Pt is scheduled for a right TKA with Dr. Claros at [facility] on 1/11/24. Pain and OA right knee. Pt is scheduled for a right TKA with Dr. Claros at [facility] on 1/11/24.

## 2023-12-22 NOTE — H&P PST ADULT - NSICDXPASTSURGICALHX_GEN_ALL_CORE_FT
PAST SURGICAL HISTORY:  History of hand surgery Right & Left    S/P arthroscopic surgery of right knee 1998    S/P arthroscopy of right shoulder About 5 years ago    S/P hip replacement, right

## 2023-12-23 LAB
VIT D25+D1,25 OH+D1,25 PNL SERPL-MCNC: 50 PG/ML — SIGNIFICANT CHANGE UP (ref 19.9–79.3)
VIT D25+D1,25 OH+D1,25 PNL SERPL-MCNC: 50 PG/ML — SIGNIFICANT CHANGE UP (ref 19.9–79.3)

## 2024-01-10 ENCOUNTER — TRANSCRIPTION ENCOUNTER (OUTPATIENT)
Age: 79
End: 2024-01-10

## 2024-01-11 ENCOUNTER — INPATIENT (INPATIENT)
Facility: HOSPITAL | Age: 79
LOS: 0 days | Discharge: ANOTHER HOSPITAL | End: 2024-01-12
Attending: ORTHOPAEDIC SURGERY | Admitting: ORTHOPAEDIC SURGERY
Payer: COMMERCIAL

## 2024-01-11 ENCOUNTER — APPOINTMENT (OUTPATIENT)
Dept: ORTHOPEDIC SURGERY | Facility: HOSPITAL | Age: 79
End: 2024-01-11
Payer: MEDICARE

## 2024-01-11 ENCOUNTER — TRANSCRIPTION ENCOUNTER (OUTPATIENT)
Age: 79
End: 2024-01-11

## 2024-01-11 ENCOUNTER — RESULT REVIEW (OUTPATIENT)
Age: 79
End: 2024-01-11

## 2024-01-11 VITALS
TEMPERATURE: 98 F | RESPIRATION RATE: 17 BRPM | SYSTOLIC BLOOD PRESSURE: 160 MMHG | HEART RATE: 91 BPM | DIASTOLIC BLOOD PRESSURE: 76 MMHG | OXYGEN SATURATION: 96 % | HEIGHT: 66 IN | WEIGHT: 128.97 LBS

## 2024-01-11 DIAGNOSIS — Z98.890 OTHER SPECIFIED POSTPROCEDURAL STATES: Chronic | ICD-10-CM

## 2024-01-11 DIAGNOSIS — Z96.641 PRESENCE OF RIGHT ARTIFICIAL HIP JOINT: Chronic | ICD-10-CM

## 2024-01-11 LAB
ANION GAP SERPL CALC-SCNC: 5 MMOL/L — SIGNIFICANT CHANGE UP (ref 5–17)
ANION GAP SERPL CALC-SCNC: 5 MMOL/L — SIGNIFICANT CHANGE UP (ref 5–17)
ANION GAP SERPL CALC-SCNC: 9 MMOL/L — SIGNIFICANT CHANGE UP (ref 5–17)
ANION GAP SERPL CALC-SCNC: 9 MMOL/L — SIGNIFICANT CHANGE UP (ref 5–17)
BUN SERPL-MCNC: 17 MG/DL — SIGNIFICANT CHANGE UP (ref 7–23)
CALCIUM SERPL-MCNC: 8.6 MG/DL — SIGNIFICANT CHANGE UP (ref 8.5–10.1)
CALCIUM SERPL-MCNC: 8.6 MG/DL — SIGNIFICANT CHANGE UP (ref 8.5–10.1)
CALCIUM SERPL-MCNC: 9.3 MG/DL — SIGNIFICANT CHANGE UP (ref 8.5–10.1)
CALCIUM SERPL-MCNC: 9.3 MG/DL — SIGNIFICANT CHANGE UP (ref 8.5–10.1)
CHLORIDE SERPL-SCNC: 108 MMOL/L — SIGNIFICANT CHANGE UP (ref 96–108)
CHLORIDE SERPL-SCNC: 108 MMOL/L — SIGNIFICANT CHANGE UP (ref 96–108)
CHLORIDE SERPL-SCNC: 110 MMOL/L — HIGH (ref 96–108)
CHLORIDE SERPL-SCNC: 110 MMOL/L — HIGH (ref 96–108)
CO2 SERPL-SCNC: 25 MMOL/L — SIGNIFICANT CHANGE UP (ref 22–31)
CREAT SERPL-MCNC: 1 MG/DL — SIGNIFICANT CHANGE UP (ref 0.5–1.3)
CREAT SERPL-MCNC: 1 MG/DL — SIGNIFICANT CHANGE UP (ref 0.5–1.3)
CREAT SERPL-MCNC: 1.03 MG/DL — SIGNIFICANT CHANGE UP (ref 0.5–1.3)
CREAT SERPL-MCNC: 1.03 MG/DL — SIGNIFICANT CHANGE UP (ref 0.5–1.3)
EGFR: 55 ML/MIN/1.73M2 — LOW
EGFR: 55 ML/MIN/1.73M2 — LOW
EGFR: 57 ML/MIN/1.73M2 — LOW
EGFR: 57 ML/MIN/1.73M2 — LOW
GLUCOSE BLDC GLUCOMTR-MCNC: 77 MG/DL — SIGNIFICANT CHANGE UP (ref 70–99)
GLUCOSE BLDC GLUCOMTR-MCNC: 77 MG/DL — SIGNIFICANT CHANGE UP (ref 70–99)
GLUCOSE SERPL-MCNC: 88 MG/DL — SIGNIFICANT CHANGE UP (ref 70–99)
GLUCOSE SERPL-MCNC: 88 MG/DL — SIGNIFICANT CHANGE UP (ref 70–99)
GLUCOSE SERPL-MCNC: 98 MG/DL — SIGNIFICANT CHANGE UP (ref 70–99)
GLUCOSE SERPL-MCNC: 98 MG/DL — SIGNIFICANT CHANGE UP (ref 70–99)
HCT VFR BLD CALC: 41.4 % — SIGNIFICANT CHANGE UP (ref 34.5–45)
HCT VFR BLD CALC: 41.4 % — SIGNIFICANT CHANGE UP (ref 34.5–45)
HCT VFR BLD CALC: 44.5 % — SIGNIFICANT CHANGE UP (ref 34.5–45)
HCT VFR BLD CALC: 44.5 % — SIGNIFICANT CHANGE UP (ref 34.5–45)
HGB BLD-MCNC: 13.4 G/DL — SIGNIFICANT CHANGE UP (ref 11.5–15.5)
HGB BLD-MCNC: 13.4 G/DL — SIGNIFICANT CHANGE UP (ref 11.5–15.5)
HGB BLD-MCNC: 14.2 G/DL — SIGNIFICANT CHANGE UP (ref 11.5–15.5)
HGB BLD-MCNC: 14.2 G/DL — SIGNIFICANT CHANGE UP (ref 11.5–15.5)
MCHC RBC-ENTMCNC: 27.8 PG — SIGNIFICANT CHANGE UP (ref 27–34)
MCHC RBC-ENTMCNC: 27.8 PG — SIGNIFICANT CHANGE UP (ref 27–34)
MCHC RBC-ENTMCNC: 28.3 PG — SIGNIFICANT CHANGE UP (ref 27–34)
MCHC RBC-ENTMCNC: 28.3 PG — SIGNIFICANT CHANGE UP (ref 27–34)
MCHC RBC-ENTMCNC: 31.9 G/DL — LOW (ref 32–36)
MCHC RBC-ENTMCNC: 31.9 G/DL — LOW (ref 32–36)
MCHC RBC-ENTMCNC: 32.4 G/DL — SIGNIFICANT CHANGE UP (ref 32–36)
MCHC RBC-ENTMCNC: 32.4 G/DL — SIGNIFICANT CHANGE UP (ref 32–36)
MCV RBC AUTO: 87.3 FL — SIGNIFICANT CHANGE UP (ref 80–100)
NRBC # BLD: 0 /100 WBCS — SIGNIFICANT CHANGE UP (ref 0–0)
PLATELET # BLD AUTO: 249 K/UL — SIGNIFICANT CHANGE UP (ref 150–400)
PLATELET # BLD AUTO: 249 K/UL — SIGNIFICANT CHANGE UP (ref 150–400)
PLATELET # BLD AUTO: 290 K/UL — SIGNIFICANT CHANGE UP (ref 150–400)
PLATELET # BLD AUTO: 290 K/UL — SIGNIFICANT CHANGE UP (ref 150–400)
POTASSIUM SERPL-MCNC: 4.1 MMOL/L — SIGNIFICANT CHANGE UP (ref 3.5–5.3)
POTASSIUM SERPL-MCNC: 4.1 MMOL/L — SIGNIFICANT CHANGE UP (ref 3.5–5.3)
POTASSIUM SERPL-MCNC: 4.3 MMOL/L — SIGNIFICANT CHANGE UP (ref 3.5–5.3)
POTASSIUM SERPL-MCNC: 4.3 MMOL/L — SIGNIFICANT CHANGE UP (ref 3.5–5.3)
POTASSIUM SERPL-SCNC: 4.1 MMOL/L — SIGNIFICANT CHANGE UP (ref 3.5–5.3)
POTASSIUM SERPL-SCNC: 4.1 MMOL/L — SIGNIFICANT CHANGE UP (ref 3.5–5.3)
POTASSIUM SERPL-SCNC: 4.3 MMOL/L — SIGNIFICANT CHANGE UP (ref 3.5–5.3)
POTASSIUM SERPL-SCNC: 4.3 MMOL/L — SIGNIFICANT CHANGE UP (ref 3.5–5.3)
RBC # BLD: 4.74 M/UL — SIGNIFICANT CHANGE UP (ref 3.8–5.2)
RBC # BLD: 4.74 M/UL — SIGNIFICANT CHANGE UP (ref 3.8–5.2)
RBC # BLD: 5.1 M/UL — SIGNIFICANT CHANGE UP (ref 3.8–5.2)
RBC # BLD: 5.1 M/UL — SIGNIFICANT CHANGE UP (ref 3.8–5.2)
RBC # FLD: 14.2 % — SIGNIFICANT CHANGE UP (ref 10.3–14.5)
RBC # FLD: 14.2 % — SIGNIFICANT CHANGE UP (ref 10.3–14.5)
RBC # FLD: 14.3 % — SIGNIFICANT CHANGE UP (ref 10.3–14.5)
RBC # FLD: 14.3 % — SIGNIFICANT CHANGE UP (ref 10.3–14.5)
SODIUM SERPL-SCNC: 140 MMOL/L — SIGNIFICANT CHANGE UP (ref 135–145)
SODIUM SERPL-SCNC: 140 MMOL/L — SIGNIFICANT CHANGE UP (ref 135–145)
SODIUM SERPL-SCNC: 142 MMOL/L — SIGNIFICANT CHANGE UP (ref 135–145)
SODIUM SERPL-SCNC: 142 MMOL/L — SIGNIFICANT CHANGE UP (ref 135–145)
WBC # BLD: 5.46 K/UL — SIGNIFICANT CHANGE UP (ref 3.8–10.5)
WBC # BLD: 5.46 K/UL — SIGNIFICANT CHANGE UP (ref 3.8–10.5)
WBC # BLD: 7.28 K/UL — SIGNIFICANT CHANGE UP (ref 3.8–10.5)
WBC # BLD: 7.28 K/UL — SIGNIFICANT CHANGE UP (ref 3.8–10.5)
WBC # FLD AUTO: 5.46 K/UL — SIGNIFICANT CHANGE UP (ref 3.8–10.5)
WBC # FLD AUTO: 5.46 K/UL — SIGNIFICANT CHANGE UP (ref 3.8–10.5)
WBC # FLD AUTO: 7.28 K/UL — SIGNIFICANT CHANGE UP (ref 3.8–10.5)
WBC # FLD AUTO: 7.28 K/UL — SIGNIFICANT CHANGE UP (ref 3.8–10.5)

## 2024-01-11 PROCEDURE — 20985 CPTR-ASST DIR MS PX: CPT

## 2024-01-11 PROCEDURE — 73560 X-RAY EXAM OF KNEE 1 OR 2: CPT | Mod: 26,RT

## 2024-01-11 PROCEDURE — 27447 TOTAL KNEE ARTHROPLASTY: CPT | Mod: RT

## 2024-01-11 PROCEDURE — 88311 DECALCIFY TISSUE: CPT | Mod: 26

## 2024-01-11 PROCEDURE — 88305 TISSUE EXAM BY PATHOLOGIST: CPT | Mod: 26

## 2024-01-11 PROCEDURE — 27447 TOTAL KNEE ARTHROPLASTY: CPT | Mod: AS,RT

## 2024-01-11 DEVICE — COMP FEM CR CMNTLSS BEADED W/ PA SZ 2 RT: Type: IMPLANTABLE DEVICE | Site: RIGHT | Status: FUNCTIONAL

## 2024-01-11 DEVICE — MAKO BONE PIN 3.2MM X 110MM: Type: IMPLANTABLE DEVICE | Site: RIGHT | Status: FUNCTIONAL

## 2024-01-11 DEVICE — INSERT TIB BEARING CS X3 SZ 3 10MM: Type: IMPLANTABLE DEVICE | Site: RIGHT | Status: FUNCTIONAL

## 2024-01-11 DEVICE — MAKO BONE PIN 3.2MM X 140MM: Type: IMPLANTABLE DEVICE | Site: RIGHT | Status: FUNCTIONAL

## 2024-01-11 DEVICE — PATELLA ASYMMETRIC TRIATHLON 29MM: Type: IMPLANTABLE DEVICE | Site: RIGHT | Status: FUNCTIONAL

## 2024-01-11 DEVICE — TRIATHLON TIBIAL COMP SZ 3: Type: IMPLANTABLE DEVICE | Site: RIGHT | Status: FUNCTIONAL

## 2024-01-11 RX ORDER — FENTANYL CITRATE 50 UG/ML
50 INJECTION INTRAVENOUS ONCE
Refills: 0 | Status: DISCONTINUED | OUTPATIENT
Start: 2024-01-11 | End: 2024-01-11

## 2024-01-11 RX ORDER — HYDROMORPHONE HYDROCHLORIDE 2 MG/ML
0.5 INJECTION INTRAMUSCULAR; INTRAVENOUS; SUBCUTANEOUS
Refills: 0 | Status: DISCONTINUED | OUTPATIENT
Start: 2024-01-11 | End: 2024-01-12

## 2024-01-11 RX ORDER — ZOLPIDEM TARTRATE 10 MG/1
1 TABLET ORAL
Qty: 0 | Refills: 0 | DISCHARGE

## 2024-01-11 RX ORDER — MAGNESIUM HYDROXIDE 400 MG/1
30 TABLET, CHEWABLE ORAL DAILY
Refills: 0 | Status: DISCONTINUED | OUTPATIENT
Start: 2024-01-11 | End: 2024-01-12

## 2024-01-11 RX ORDER — FENTANYL CITRATE 50 UG/ML
25 INJECTION INTRAVENOUS ONCE
Refills: 0 | Status: DISCONTINUED | OUTPATIENT
Start: 2024-01-11 | End: 2024-01-11

## 2024-01-11 RX ORDER — DEXAMETHASONE 0.5 MG/5ML
10 ELIXIR ORAL ONCE
Refills: 0 | Status: COMPLETED | OUTPATIENT
Start: 2024-01-12 | End: 2024-01-12

## 2024-01-11 RX ORDER — ONDANSETRON 8 MG/1
4 TABLET, FILM COATED ORAL EVERY 6 HOURS
Refills: 0 | Status: DISCONTINUED | OUTPATIENT
Start: 2024-01-11 | End: 2024-01-12

## 2024-01-11 RX ORDER — ONDANSETRON 8 MG/1
4 TABLET, FILM COATED ORAL ONCE
Refills: 0 | Status: DISCONTINUED | OUTPATIENT
Start: 2024-01-11 | End: 2024-01-11

## 2024-01-11 RX ORDER — DULOXETINE HYDROCHLORIDE 30 MG/1
60 CAPSULE, DELAYED RELEASE ORAL DAILY
Refills: 0 | Status: DISCONTINUED | OUTPATIENT
Start: 2024-01-11 | End: 2024-01-12

## 2024-01-11 RX ORDER — SODIUM CHLORIDE 9 MG/ML
1000 INJECTION INTRAMUSCULAR; INTRAVENOUS; SUBCUTANEOUS
Refills: 0 | Status: DISCONTINUED | OUTPATIENT
Start: 2024-01-11 | End: 2024-01-12

## 2024-01-11 RX ORDER — POLYETHYLENE GLYCOL 3350 17 G/17G
17 POWDER, FOR SOLUTION ORAL AT BEDTIME
Refills: 0 | Status: DISCONTINUED | OUTPATIENT
Start: 2024-01-11 | End: 2024-01-12

## 2024-01-11 RX ORDER — ASPIRIN/CALCIUM CARB/MAGNESIUM 324 MG
81 TABLET ORAL
Refills: 0 | Status: DISCONTINUED | OUTPATIENT
Start: 2024-01-12 | End: 2024-01-12

## 2024-01-11 RX ORDER — HYDROMORPHONE HYDROCHLORIDE 2 MG/ML
0.5 INJECTION INTRAMUSCULAR; INTRAVENOUS; SUBCUTANEOUS
Refills: 0 | Status: DISCONTINUED | OUTPATIENT
Start: 2024-01-11 | End: 2024-01-11

## 2024-01-11 RX ORDER — LAMOTRIGINE 25 MG/1
1 TABLET, ORALLY DISINTEGRATING ORAL
Qty: 0 | Refills: 0 | DISCHARGE

## 2024-01-11 RX ORDER — CELECOXIB 200 MG/1
200 CAPSULE ORAL ONCE
Refills: 0 | Status: COMPLETED | OUTPATIENT
Start: 2024-01-11 | End: 2024-01-11

## 2024-01-11 RX ORDER — OXYCODONE HYDROCHLORIDE 5 MG/1
5 TABLET ORAL EVERY 4 HOURS
Refills: 0 | Status: COMPLETED | OUTPATIENT
Start: 2024-01-11 | End: 2024-01-12

## 2024-01-11 RX ORDER — CEFAZOLIN SODIUM 1 G
2000 VIAL (EA) INJECTION EVERY 8 HOURS
Refills: 0 | Status: COMPLETED | OUTPATIENT
Start: 2024-01-11 | End: 2024-01-12

## 2024-01-11 RX ORDER — SODIUM CHLORIDE 9 MG/ML
1000 INJECTION, SOLUTION INTRAVENOUS
Refills: 0 | Status: DISCONTINUED | OUTPATIENT
Start: 2024-01-11 | End: 2024-01-11

## 2024-01-11 RX ORDER — HYDROMORPHONE HYDROCHLORIDE 2 MG/ML
1 INJECTION INTRAMUSCULAR; INTRAVENOUS; SUBCUTANEOUS
Refills: 0 | Status: DISCONTINUED | OUTPATIENT
Start: 2024-01-11 | End: 2024-01-12

## 2024-01-11 RX ORDER — ACETAMINOPHEN 500 MG
1000 TABLET ORAL EVERY 8 HOURS
Refills: 0 | Status: DISCONTINUED | OUTPATIENT
Start: 2024-01-11 | End: 2024-01-12

## 2024-01-11 RX ORDER — SENNA PLUS 8.6 MG/1
2 TABLET ORAL AT BEDTIME
Refills: 0 | Status: DISCONTINUED | OUTPATIENT
Start: 2024-01-11 | End: 2024-01-12

## 2024-01-11 RX ORDER — CELECOXIB 200 MG/1
200 CAPSULE ORAL EVERY 12 HOURS
Refills: 0 | Status: DISCONTINUED | OUTPATIENT
Start: 2024-01-12 | End: 2024-01-12

## 2024-01-11 RX ORDER — OXYCODONE HYDROCHLORIDE 5 MG/1
10 TABLET ORAL
Refills: 0 | Status: DISCONTINUED | OUTPATIENT
Start: 2024-01-11 | End: 2024-01-12

## 2024-01-11 RX ORDER — ACETAMINOPHEN 500 MG
1000 TABLET ORAL ONCE
Refills: 0 | Status: COMPLETED | OUTPATIENT
Start: 2024-01-11 | End: 2024-01-12

## 2024-01-11 RX ORDER — ZOLPIDEM TARTRATE 10 MG/1
5 TABLET ORAL AT BEDTIME
Refills: 0 | Status: DISCONTINUED | OUTPATIENT
Start: 2024-01-11 | End: 2024-01-12

## 2024-01-11 RX ORDER — OXYCODONE HYDROCHLORIDE 5 MG/1
5 TABLET ORAL
Refills: 0 | Status: DISCONTINUED | OUTPATIENT
Start: 2024-01-11 | End: 2024-01-12

## 2024-01-11 RX ORDER — INFLUENZA VIRUS VACCINE 15; 15; 15; 15 UG/.5ML; UG/.5ML; UG/.5ML; UG/.5ML
0.7 SUSPENSION INTRAMUSCULAR ONCE
Refills: 0 | Status: DISCONTINUED | OUTPATIENT
Start: 2024-01-11 | End: 2024-01-12

## 2024-01-11 RX ORDER — PANTOPRAZOLE SODIUM 20 MG/1
40 TABLET, DELAYED RELEASE ORAL
Refills: 0 | Status: DISCONTINUED | OUTPATIENT
Start: 2024-01-11 | End: 2024-01-12

## 2024-01-11 RX ORDER — LANOLIN ALCOHOL/MO/W.PET/CERES
3 CREAM (GRAM) TOPICAL AT BEDTIME
Refills: 0 | Status: DISCONTINUED | OUTPATIENT
Start: 2024-01-11 | End: 2024-01-12

## 2024-01-11 RX ORDER — LAMOTRIGINE 25 MG/1
200 TABLET, ORALLY DISINTEGRATING ORAL AT BEDTIME
Refills: 0 | Status: DISCONTINUED | OUTPATIENT
Start: 2024-01-11 | End: 2024-01-12

## 2024-01-11 RX ORDER — KETOROLAC TROMETHAMINE 30 MG/ML
30 SYRINGE (ML) INJECTION EVERY 8 HOURS
Refills: 0 | Status: DISCONTINUED | OUTPATIENT
Start: 2024-01-11 | End: 2024-01-12

## 2024-01-11 RX ORDER — SODIUM CHLORIDE 9 MG/ML
1000 INJECTION, SOLUTION INTRAVENOUS
Refills: 0 | Status: DISCONTINUED | OUTPATIENT
Start: 2024-01-11 | End: 2024-01-12

## 2024-01-11 RX ORDER — ACETAMINOPHEN 500 MG
650 TABLET ORAL ONCE
Refills: 0 | Status: COMPLETED | OUTPATIENT
Start: 2024-01-11 | End: 2024-01-11

## 2024-01-11 RX ORDER — DULOXETINE HYDROCHLORIDE 30 MG/1
1 CAPSULE, DELAYED RELEASE ORAL
Refills: 0 | DISCHARGE

## 2024-01-11 RX ADMIN — OXYCODONE HYDROCHLORIDE 5 MILLIGRAM(S): 5 TABLET ORAL at 22:28

## 2024-01-11 RX ADMIN — CELECOXIB 200 MILLIGRAM(S): 200 CAPSULE ORAL at 13:03

## 2024-01-11 RX ADMIN — Medication 1000 MILLIGRAM(S): at 23:27

## 2024-01-11 RX ADMIN — HYDROMORPHONE HYDROCHLORIDE 0.5 MILLIGRAM(S): 2 INJECTION INTRAMUSCULAR; INTRAVENOUS; SUBCUTANEOUS at 20:20

## 2024-01-11 RX ADMIN — POLYETHYLENE GLYCOL 3350 17 GRAM(S): 17 POWDER, FOR SOLUTION ORAL at 22:29

## 2024-01-11 RX ADMIN — Medication 1000 MILLIGRAM(S): at 22:27

## 2024-01-11 RX ADMIN — OXYCODONE HYDROCHLORIDE 5 MILLIGRAM(S): 5 TABLET ORAL at 23:27

## 2024-01-11 RX ADMIN — SENNA PLUS 2 TABLET(S): 8.6 TABLET ORAL at 22:28

## 2024-01-11 RX ADMIN — HYDROMORPHONE HYDROCHLORIDE 0.5 MILLIGRAM(S): 2 INJECTION INTRAMUSCULAR; INTRAVENOUS; SUBCUTANEOUS at 20:53

## 2024-01-11 RX ADMIN — Medication 650 MILLIGRAM(S): at 13:03

## 2024-01-11 NOTE — DISCHARGE NOTE PROVIDER - NSDCMRMEDTOKEN_GEN_ALL_CORE_FT
Ambien: 1 tab(s) orally once a day (at bedtime), As Needed  Cymbalta 60 mg oral delayed release capsule: 1 cap(s) orally once a day  lamoTRIgine 200 mg oral tablet, extended release: 1 tab(s) orally once a day (at bedtime)   acetaminophen 500 mg oral tablet: 2 tab(s) orally every 8 hours  Ambien: 1 tab(s) orally once a day (at bedtime), As Needed  aspirin 81 mg oral delayed release tablet: 1 tab(s) orally 2 times a day  bisacodyl 10 mg rectal suppository: 1 suppository(ies) rectal once As needed Constipation  celecoxib 200 mg oral capsule: 1 cap(s) orally every 12 hours  Cymbalta 60 mg oral delayed release capsule: 1 cap(s) orally once a day  lamoTRIgine 200 mg oral tablet, extended release: 1 tab(s) orally once a day (at bedtime)  melatonin 3 mg oral tablet: 1 tab(s) orally once a day (at bedtime)  Multiple Vitamins oral tablet: 1 tab(s) orally once a day  oxyCODONE 10 mg oral tablet: 1 tab(s) orally every 3 hours As needed pain 6-10  oxyCODONE 5 mg oral tablet: 1 tab(s) orally every 4 hours STOP AFTER 24HRS  oxyCODONE 5 mg oral tablet: 1 tab(s) orally every 3 hours As needed pain 1-5  pantoprazole 40 mg oral delayed release tablet: 1 tab(s) orally once a day (before a meal)  polyethylene glycol 3350 oral powder for reconstitution: 17 gram(s) orally once a day (at bedtime)  senna leaf extract oral tablet: 2 tab(s) orally once a day (at bedtime)

## 2024-01-11 NOTE — DISCHARGE NOTE PROVIDER - NSDCFUSCHEDAPPT_GEN_ALL_CORE_FT
[facility] Physician [facility]  [address]  Scheduled Appointment: 01/23/2024     [facility] Physician [facility]  [address]  Scheduled Appointment: 01/23/2024     [facility] Physician [facility]  [address]  Scheduled Appointment: 01/23/2024

## 2024-01-11 NOTE — DISCHARGE NOTE PROVIDER - NSDCFUADDAPPT_GEN_ALL_CORE_FT
Follow up with your surgeon in two weeks. Call for appointment.    If you need more pain medications, call your surgeon's office. For medication refills or authorizations call [phone] xt 2301    Make sure to have a bowel movement by 2 days after surgery. Take stool softners and laxatives as needed.    Call and schedule a follow up appointment with your primary care physician for repeat blood work (CBC and BMP) for post hospital discharge follow-up care.    Call your surgeon if you have increased redness/pain/drainage or fever. Return to ER for shortness of breath/calf tenderness. Follow up with your surgeon in two weeks. Call for appointment.    If you need more pain medications, call your surgeon's office. For medication refills or authorizations call [phone] xt 2301    Make sure to have a bowel movement by 2 days after surgery. Take stool softners and laxatives as needed.    Call and schedule a follow up appointment with your primary care physician for repeat blood work (CBC and BMP) for post hospital discharge follow-up care.    Call your surgeon if you have increased redness/pain/drainage or fever. Return to ER for shortness of breath/calf tenderness. Follow up with your surgeon in two weeks. Call for appointment.    If you need more pain medications, call your surgeon's office. For medication refills or authorizations call [phone] xt 2301    Make sure to have a bowel movement by 2 days after surgery. Take stool softners and laxatives as needed.    Call and schedule a follow up appointment with your primary care physician for repeat blood work (CBC and BMP) for post hospital discharge follow-up care.    Call your surgeon if you have increased redness/pain/drainage or fever. Return to ER for shortness of breath/calf tenderness.

## 2024-01-11 NOTE — DISCHARGE NOTE PROVIDER - NSDCCPTREATMENT_GEN_ALL_CORE_FT
Alban Ball (:  2009) is a 11 y.o. male,Established patient, here for evaluation of the following chief complaint(s): Annual Exam (Sports Physical. Basketball & Baseball. )    Beau Kenney is here today with his mom, Yanira Madrigal. He is here for a sports physical to play Basketball and [address]. ASSESSMENT/PLAN:  1. Sports physical    Denies chest pain, SOB, cough, fatigue, dizziness, or near syncope with physical activity. Health history benign for any cardiac concerns or events. No rashes or open lesions. Cleared for physical activity and all sports with no restrictions. See scanned media form. Return if symptoms worsen or fail to improve. An electronic signature was used to authenticate this note.     --HEATHER Emanuel - CNP
PRINCIPAL PROCEDURE  Procedure: Right total knee replacement  Findings and Treatment:

## 2024-01-11 NOTE — DISCHARGE NOTE PROVIDER - PROVIDER TOKENS
PROVIDER:[TOKEN:[[id number]]] PROVIDER:[TOKEN:[[id number]]] PROVIDER:[TOKEN:[[id number]]]

## 2024-01-11 NOTE — DISCHARGE NOTE PROVIDER - NSDCFUADDINST_GEN_ALL_CORE_FT
Keep Prineo Dressing Clean, Dry and Intact. May shower with Prineo Dressing. Please do not scrub, soak, peel or pick at the prineo dressing. No creams, lotions, or oils over dressing. May shower and let water run over incision, no baths. Pat dry once out of shower. Dressing to be removed in office at follow up visit in 2 weeks. There are no staples or stitches that need to be removed.  If you notice any redness, irritation, or itching around the prineo dressing call the surgeon's office    Per Dr. Claros: may advance from walker as tolerated per discretion of physical therapist.   Keep knee straight while at rest. Elevate the leg as much as possible ("toes above the nose") to help control swelling. Make sure you get up and take a brief walk every two hours to help with circulation and prevent stiffness. Incentive spirometer 10X/hour. Cryocuff to help with pain/inflammation.

## 2024-01-11 NOTE — DISCHARGE NOTE PROVIDER - CARE PROVIDER_API CALL
Lukas Claros.  Orthopaedic Surgery  [address] [facility], [address] [place] [address]  Phone: [phone]  Fax: [phone]  Follow Up Time:    Lukas Claros.  Orthopaedic Surgery  [address] [facility], [address] [place] [address]  Phone: [phone]  Fax: [phone]  Follow Up Time:    Lukas Claros.  Orthopaedic Surgery  [address] [facility], [address] [place] [address]  Phone: [phone]  Fax: [phone]  Follow Up Time:

## 2024-01-11 NOTE — PATIENT PROFILE ADULT - FALL HARM RISK - UNIVERSAL INTERVENTIONS
Bed in lowest position, wheels locked, appropriate side rails in place/Call bell, personal items and telephone in reach/Instruct patient to call for assistance before getting out of bed or chair/Non-slip footwear when patient is out of bed/[place] to call system/Physically safe environment - no spills, clutter or unnecessary equipment/Purposeful Proactive Rounding/Room/bathroom lighting operational, light cord in reach Bed in lowest position, wheels locked, appropriate side rails in place/Call bell, personal items and telephone in reach/Instruct patient to call for assistance before getting out of bed or chair/Non-slip footwear when patient is out of bed/[place] to call system/Physically safe environment - no spills, clutter or unnecessary equipment/Purposeful Proactive Rounding/Room/bathroom lighting operational, light cord in reach

## 2024-01-11 NOTE — DISCHARGE NOTE PROVIDER - HOSPITAL COURSE
78yFemale with history of OA presenting for R TKA by Dr. Claros on 1/11/2024. Risk and benefits of surgery were explained to the patient. The patient understood and agreed to proceed with surgery. Patient underwent the procedure with no intraoperative complications. Pt was brought in stable condition to the PACU. Once stable in PACU, pt was brought to the floor. During hospital stay pt was followed by Medicine, physical therapy, Home Care during this admission. Pt had an uneventful hospital course. Pt is stable for discharge to home 78yFemale with history of OA presenting for R TKA by Dr. Claros on 1/11/2024. Risk and benefits of surgery were explained to the patient. The patient understood and agreed to proceed with surgery. Patient underwent the procedure with no intraoperative complications. Pt was brought in stable condition to the PACU. Once stable in PACU, pt was brought to the floor. During hospital stay pt was followed by Medicine, physical therapy, Home Care during this admission. Pt had an uneventful hospital course. Pt is stable for discharge to rehab

## 2024-01-11 NOTE — PATIENT PROFILE ADULT - NSPROGENSOURCEINFO_GEN_A_NUR
23 month-old male, no PMHx, brought by mother for evaluation. Earlier today while playing in the street, got his leg stuck in a drain for 10 minutes. Mother is unsure of which leg it is. EMS was called and leg was removed with ease. Was told to come to ER to make sure everything is OK. Mother reports child has been acting like himself and running around normally. No other complaints. patient

## 2024-01-11 NOTE — CONSULT NOTE ADULT - SUBJECTIVE AND OBJECTIVE BOX
ALVIN DOYLE is a 78y Female s/p ROBOTIC ASSISTED RIGHT TOTAL KNEE ARTHROPLASTY WITH DAT      w/ h/o No pertinent past medical history    Depression    Hepatitis B    Skin cancer of face      denies any chest pain shortness of breath palpitation dizziness lightheadedness nausea vomiting fever or chills    S/P arthroscopic surgery of right knee    S/P arthroscopy of right shoulder    History of hand surgery    S/P hip replacement, right        SH: doesnot smoke or drink at this time    Grass , Trees &amp; Weeds (Hives)  No Known Drug Allergies    acetaminophen     Tablet .. 1000 milliGRAM(s) Oral every 8 hours  acetaminophen   IVPB .. 1000 milliGRAM(s) IV Intermittent once  DULoxetine 60 milliGRAM(s) Oral daily  HYDROmorphone  Injectable 0.5 milliGRAM(s) IV Push every 10 minutes PRN  HYDROmorphone  Injectable 0.5 milliGRAM(s) IV Push every 3 hours PRN  HYDROmorphone  Injectable 1 milliGRAM(s) IV Push every 10 minutes PRN  influenza  Vaccine (HIGH DOSE) 0.7 milliLiter(s) IntraMuscular once  lactated ringers. 1000 milliLiter(s) IV Continuous <Continuous>  lamoTRIgine 200 milliGRAM(s) Oral at bedtime  magnesium hydroxide Suspension 30 milliLiter(s) Oral daily PRN  multivitamin 1 Tablet(s) Oral daily  ondansetron Injectable 4 milliGRAM(s) IV Push every 6 hours PRN  oxyCODONE    IR 5 milliGRAM(s) Oral every 3 hours PRN  oxyCODONE    IR 5 milliGRAM(s) Oral every 4 hours  oxyCODONE    IR 10 milliGRAM(s) Oral every 3 hours PRN  pantoprazole    Tablet 40 milliGRAM(s) Oral before breakfast  polyethylene glycol 3350 17 Gram(s) Oral at bedtime  senna 2 Tablet(s) Oral at bedtime  sodium chloride 0.9%. 1000 milliLiter(s) IV Continuous <Continuous>  zolpidem 5 milliGRAM(s) Oral at bedtime PRN    T(C): 36.8 (01-11-24 @ 22:10), Max: 36.9 (01-11-24 @ 20:00)  HR: 85 (01-11-24 @ 22:10) (64 - 93)  BP: 153/73 (01-11-24 @ 22:10) (125/65 - 164/84)  RR: 18 (01-11-24 @ 22:10) (12 - 18)  SpO2: 92% (01-11-24 @ 22:10) (92% - 99%)  HEENT unremarkable  neck no JVD or bruit  heart normal S1 S2 RRR no gallops or rubs  chest clear to auscultation  abd sof nontender non distended +bs  ext no calf tenderness    A/P   DVT PX  pain control  bowel regimen   wound care as per ortho  GI PX  antiemetics prn  incentive spirometer

## 2024-01-11 NOTE — PATIENT PROFILE ADULT - CAREGIVER ADDRESS
E-VISIT PROGRESS NOTE    HISTORY    The patient is a 39 year old female who is requesting an asynchronous E-Visit for evaluation of sinus condition.    The patient's responses to the questions contained in the condition-specific questionnaire submission were reviewed.    MEDICATIONS  The medication list in the medical record as well as updates to the medication list submitted by the patient with this E-Visit were reviewed.      ALLERGIES  Allergies as of 08/17/2020 - Reviewed 08/17/2020   Allergen Reaction Noted   • Naproxen HIVES 07/27/2015       HISTORIES  I have personally reviewed and updated the following electronic medical record sections: Current medications, Allergies, Problem list, Past Medical History, Past Surgical History, Social History and Family History      ASSESSMENT/PLAN  Acute bacterial rhinosinusitis  - amoxicillin-clavulanate (Augmentin) 875-125 MG per tablet; Take 1 tablet by mouth 2 times daily for 10 days.  Dispense: 20 tablet; Refill: 0     same

## 2024-01-11 NOTE — PATIENT PROFILE ADULT - NSPROPTRIGHTSUPPORTPERSON_GEN_A_NUR
Great Job on Healing! We are so happy you chose us to be apart of your wound healing journey. Please feel free to call us should you need us again.   Our phone number is [phone], we are open Monday - Friday 7:00 am- 5:00 pm.     Some important tips to stay healed:  Eat a balanced diet- good protein and if you are diabetic control blood sugar levels.  Stay active- activities and exercise promote good circulation  Avoid smoking- smoking narrows your blood vessels.   Avoid shoes that rub your feet as this may cause a wound.  Moisturize your skin daily, avoiding scented lotion. Avoid excess lotion between your toes.   Most importantly inspect your skin daily.     Preventing a pressure injury:  Change your position often, when in bed turn of change position every 1-2 hours. When sitting in a chair, shift your weight at least every hour.  Use a pillow to lift your heels off the bed  Keep your skin dry.  Do not slide your skin across the sheets, lift and move your arms and legs.   You may need to have a special mattress on your bed or chair or both.           same name as above

## 2024-01-12 ENCOUNTER — TRANSCRIPTION ENCOUNTER (OUTPATIENT)
Age: 79
End: 2024-01-12

## 2024-01-12 VITALS
SYSTOLIC BLOOD PRESSURE: 124 MMHG | RESPIRATION RATE: 16 BRPM | DIASTOLIC BLOOD PRESSURE: 58 MMHG | TEMPERATURE: 98 F | OXYGEN SATURATION: 94 % | HEART RATE: 89 BPM

## 2024-01-12 LAB
ANION GAP SERPL CALC-SCNC: 3 MMOL/L — LOW (ref 5–17)
ANION GAP SERPL CALC-SCNC: 3 MMOL/L — LOW (ref 5–17)
BUN SERPL-MCNC: 16 MG/DL — SIGNIFICANT CHANGE UP (ref 7–23)
BUN SERPL-MCNC: 16 MG/DL — SIGNIFICANT CHANGE UP (ref 7–23)
CALCIUM SERPL-MCNC: 9 MG/DL — SIGNIFICANT CHANGE UP (ref 8.5–10.1)
CALCIUM SERPL-MCNC: 9 MG/DL — SIGNIFICANT CHANGE UP (ref 8.5–10.1)
CHLORIDE SERPL-SCNC: 109 MMOL/L — HIGH (ref 96–108)
CHLORIDE SERPL-SCNC: 109 MMOL/L — HIGH (ref 96–108)
CO2 SERPL-SCNC: 29 MMOL/L — SIGNIFICANT CHANGE UP (ref 22–31)
CO2 SERPL-SCNC: 29 MMOL/L — SIGNIFICANT CHANGE UP (ref 22–31)
CREAT SERPL-MCNC: 0.9 MG/DL — SIGNIFICANT CHANGE UP (ref 0.5–1.3)
CREAT SERPL-MCNC: 0.9 MG/DL — SIGNIFICANT CHANGE UP (ref 0.5–1.3)
EGFR: 65 ML/MIN/1.73M2 — SIGNIFICANT CHANGE UP
EGFR: 65 ML/MIN/1.73M2 — SIGNIFICANT CHANGE UP
FLUAV AG NPH QL: SIGNIFICANT CHANGE UP
FLUAV AG NPH QL: SIGNIFICANT CHANGE UP
FLUBV AG NPH QL: SIGNIFICANT CHANGE UP
FLUBV AG NPH QL: SIGNIFICANT CHANGE UP
GLUCOSE SERPL-MCNC: 141 MG/DL — HIGH (ref 70–99)
GLUCOSE SERPL-MCNC: 141 MG/DL — HIGH (ref 70–99)
HCT VFR BLD CALC: 40.6 % — SIGNIFICANT CHANGE UP (ref 34.5–45)
HCT VFR BLD CALC: 40.6 % — SIGNIFICANT CHANGE UP (ref 34.5–45)
HGB BLD-MCNC: 12.9 G/DL — SIGNIFICANT CHANGE UP (ref 11.5–15.5)
HGB BLD-MCNC: 12.9 G/DL — SIGNIFICANT CHANGE UP (ref 11.5–15.5)
MCHC RBC-ENTMCNC: 28 PG — SIGNIFICANT CHANGE UP (ref 27–34)
MCHC RBC-ENTMCNC: 28 PG — SIGNIFICANT CHANGE UP (ref 27–34)
MCHC RBC-ENTMCNC: 31.8 G/DL — LOW (ref 32–36)
MCHC RBC-ENTMCNC: 31.8 G/DL — LOW (ref 32–36)
MCV RBC AUTO: 88.1 FL — SIGNIFICANT CHANGE UP (ref 80–100)
MCV RBC AUTO: 88.1 FL — SIGNIFICANT CHANGE UP (ref 80–100)
NRBC # BLD: 0 /100 WBCS — SIGNIFICANT CHANGE UP (ref 0–0)
NRBC # BLD: 0 /100 WBCS — SIGNIFICANT CHANGE UP (ref 0–0)
PLATELET # BLD AUTO: 280 K/UL — SIGNIFICANT CHANGE UP (ref 150–400)
PLATELET # BLD AUTO: 280 K/UL — SIGNIFICANT CHANGE UP (ref 150–400)
POTASSIUM SERPL-MCNC: 4.9 MMOL/L — SIGNIFICANT CHANGE UP (ref 3.5–5.3)
POTASSIUM SERPL-MCNC: 4.9 MMOL/L — SIGNIFICANT CHANGE UP (ref 3.5–5.3)
POTASSIUM SERPL-SCNC: 4.9 MMOL/L — SIGNIFICANT CHANGE UP (ref 3.5–5.3)
POTASSIUM SERPL-SCNC: 4.9 MMOL/L — SIGNIFICANT CHANGE UP (ref 3.5–5.3)
RBC # BLD: 4.61 M/UL — SIGNIFICANT CHANGE UP (ref 3.8–5.2)
RBC # BLD: 4.61 M/UL — SIGNIFICANT CHANGE UP (ref 3.8–5.2)
RBC # FLD: 14.3 % — SIGNIFICANT CHANGE UP (ref 10.3–14.5)
RBC # FLD: 14.3 % — SIGNIFICANT CHANGE UP (ref 10.3–14.5)
SARS-COV-2 RNA SPEC QL NAA+PROBE: SIGNIFICANT CHANGE UP
SARS-COV-2 RNA SPEC QL NAA+PROBE: SIGNIFICANT CHANGE UP
SODIUM SERPL-SCNC: 141 MMOL/L — SIGNIFICANT CHANGE UP (ref 135–145)
SODIUM SERPL-SCNC: 141 MMOL/L — SIGNIFICANT CHANGE UP (ref 135–145)
WBC # BLD: 13.48 K/UL — HIGH (ref 3.8–10.5)
WBC # BLD: 13.48 K/UL — HIGH (ref 3.8–10.5)
WBC # FLD AUTO: 13.48 K/UL — HIGH (ref 3.8–10.5)
WBC # FLD AUTO: 13.48 K/UL — HIGH (ref 3.8–10.5)

## 2024-01-12 RX ORDER — OXYCODONE HYDROCHLORIDE 5 MG/1
1 TABLET ORAL
Qty: 0 | Refills: 0 | DISCHARGE
Start: 2024-01-12

## 2024-01-12 RX ORDER — LANOLIN ALCOHOL/MO/W.PET/CERES
1 CREAM (GRAM) TOPICAL
Qty: 0 | Refills: 0 | DISCHARGE
Start: 2024-01-12

## 2024-01-12 RX ORDER — CELECOXIB 200 MG/1
1 CAPSULE ORAL
Qty: 0 | Refills: 0 | DISCHARGE
Start: 2024-01-12

## 2024-01-12 RX ORDER — ACETAMINOPHEN 500 MG
2 TABLET ORAL
Qty: 0 | Refills: 0 | DISCHARGE
Start: 2024-01-12

## 2024-01-12 RX ORDER — POLYETHYLENE GLYCOL 3350 17 G/17G
17 POWDER, FOR SOLUTION ORAL
Qty: 0 | Refills: 0 | DISCHARGE
Start: 2024-01-12

## 2024-01-12 RX ORDER — SENNA PLUS 8.6 MG/1
2 TABLET ORAL
Qty: 0 | Refills: 0 | DISCHARGE
Start: 2024-01-12

## 2024-01-12 RX ORDER — PANTOPRAZOLE SODIUM 20 MG/1
1 TABLET, DELAYED RELEASE ORAL
Qty: 0 | Refills: 0 | DISCHARGE
Start: 2024-01-12

## 2024-01-12 RX ORDER — ASPIRIN/CALCIUM CARB/MAGNESIUM 324 MG
1 TABLET ORAL
Qty: 0 | Refills: 0 | DISCHARGE
Start: 2024-01-12

## 2024-01-12 RX ADMIN — CELECOXIB 200 MILLIGRAM(S): 200 CAPSULE ORAL at 06:15

## 2024-01-12 RX ADMIN — Medication 30 MILLIGRAM(S): at 09:10

## 2024-01-12 RX ADMIN — Medication 81 MILLIGRAM(S): at 06:16

## 2024-01-12 RX ADMIN — Medication 1 MILLIGRAM(S): at 12:49

## 2024-01-12 RX ADMIN — SODIUM CHLORIDE 75 MILLILITER(S): 9 INJECTION INTRAMUSCULAR; INTRAVENOUS; SUBCUTANEOUS at 06:16

## 2024-01-12 RX ADMIN — LAMOTRIGINE 200 MILLIGRAM(S): 25 TABLET, ORALLY DISINTEGRATING ORAL at 12:23

## 2024-01-12 RX ADMIN — Medication 100 MILLIGRAM(S): at 00:31

## 2024-01-12 RX ADMIN — ONDANSETRON 4 MILLIGRAM(S): 8 TABLET, FILM COATED ORAL at 09:51

## 2024-01-12 RX ADMIN — OXYCODONE HYDROCHLORIDE 5 MILLIGRAM(S): 5 TABLET ORAL at 06:15

## 2024-01-12 RX ADMIN — Medication 1000 MILLIGRAM(S): at 15:00

## 2024-01-12 RX ADMIN — Medication 1000 MILLIGRAM(S): at 07:09

## 2024-01-12 RX ADMIN — ZOLPIDEM TARTRATE 5 MILLIGRAM(S): 10 TABLET ORAL at 00:32

## 2024-01-12 RX ADMIN — DULOXETINE HYDROCHLORIDE 60 MILLIGRAM(S): 30 CAPSULE, DELAYED RELEASE ORAL at 12:22

## 2024-01-12 RX ADMIN — Medication 1000 MILLIGRAM(S): at 06:15

## 2024-01-12 RX ADMIN — OXYCODONE HYDROCHLORIDE 5 MILLIGRAM(S): 5 TABLET ORAL at 03:02

## 2024-01-12 RX ADMIN — CELECOXIB 200 MILLIGRAM(S): 200 CAPSULE ORAL at 07:09

## 2024-01-12 RX ADMIN — Medication 102 MILLIGRAM(S): at 06:16

## 2024-01-12 RX ADMIN — Medication 1 TABLET(S): at 12:23

## 2024-01-12 RX ADMIN — Medication 400 MILLIGRAM(S): at 14:04

## 2024-01-12 RX ADMIN — Medication 30 MILLIGRAM(S): at 08:24

## 2024-01-12 RX ADMIN — OXYCODONE HYDROCHLORIDE 5 MILLIGRAM(S): 5 TABLET ORAL at 02:02

## 2024-01-12 RX ADMIN — PANTOPRAZOLE SODIUM 40 MILLIGRAM(S): 20 TABLET, DELAYED RELEASE ORAL at 06:15

## 2024-01-12 RX ADMIN — OXYCODONE HYDROCHLORIDE 5 MILLIGRAM(S): 5 TABLET ORAL at 07:09

## 2024-01-12 RX ADMIN — Medication 100 MILLIGRAM(S): at 07:59

## 2024-01-12 NOTE — OCCUPATIONAL THERAPY INITIAL EVALUATION ADULT - TRANSFER TRAINING, PT EVAL
Patient will be able to perform functional transfers, using least restrictive device, independently within 3-4 weeks.

## 2024-01-12 NOTE — PHYSICAL THERAPY INITIAL EVALUATION ADULT - PHYSICAL ASSIST/NONPHYSICAL ASSIST: SUPINE/SIT, REHAB EVAL
[facility]: Health  Follow-Up    Patient reports her  signed up for the HDMP program, but has been using her blood pressure cuff.  Reports they did not go to the [facility] to get another blood pressure cuff.  Encouraged patient to go to the [facility] or call [facility] technical support team.    The history is provided by the patient.   Hypertension       Follow Up  Follow-up reason(s): routine education      Routine Education Topics: eating patterns and physical activity            Diet:       Intervention(s): reducing sodium intake and reading food labels    Physical Activity:   When asked if exercising, patient responded: yes3 day(s) a week.      Patient participates in the following activities: walking      SDOH    INTERVENTION(S)  recommend physical activity, reviewed monitoring technique and encouragement/support    PLAN  patient verbalizes understanding    Provided patient with [place] [facility] information and HDMP technical team's phone number.      There are no preventive care reminders to display for this patient.    Last 5 Patient Entered Readings                                      Current 30 Day Average: 130/86     Recent Readings 9/26/2019 9/18/2019 9/10/2019 9/2/2019 8/25/2019    SBP (mmHg) 140 120 130 133 128    DBP (mmHg) 88 82 87 89 84    Pulse 67 59 53 59 56                 verbal cues

## 2024-01-12 NOTE — OCCUPATIONAL THERAPY INITIAL EVALUATION ADULT - GENERAL OBSERVATIONS, REHAB EVAL
Pt encountered seated in recliner, NAD, AXOX4, +R knee dressing c/d/i, +heplock, pt c/o pain s/p right TKA,

## 2024-01-12 NOTE — OCCUPATIONAL THERAPY INITIAL EVALUATION ADULT - PATIENT/FAMILY/SIGNIFICANT OTHER GOALS STATEMENT, OT EVAL
Pt. reports she wants to improve performance with self-care tasks and be able to walk better to prevent falls

## 2024-01-12 NOTE — DISCHARGE NOTE NURSING/CASE MANAGEMENT/SOCIAL WORK - PATIENT PORTAL LINK FT
You can access the FollowMyHealth Patient Portal offered by [facility] by registering at the following website: [email or web address]. By joining [employer]’s FollowMyHealth portal, you will also be able to view your health information using other applications (apps) compatible with our system. You can access the FollowMyHealth Patient Portal offered by [facility] by registering at the following website: [email or web address]. By joining [employer]’s FollowMyHealth portal, you will also be able to view your health information using other applications (apps) compatible with our system.

## 2024-01-12 NOTE — PHYSICAL THERAPY INITIAL EVALUATION ADULT - ADDITIONAL COMMENTS
As per pre-op and reviewed with patient POD #0: Pt reports she lives with 2 grandchildren in a private house with 2 steps to enter, no rail & 13 steps with bilateral close rails to navigate inside. Pt has a walk-in shower, retractable shower head & standard toilet. Pt is independent with ADLs and mobility. Pt has no recent PT, no falls and has leg buckling. Pt wears glasses, no hearing aids, drives & is right handed.

## 2024-01-12 NOTE — DISCHARGE NOTE NURSING/CASE MANAGEMENT/SOCIAL WORK - NSDCFUADDAPPT_GEN_ALL_CORE_FT
Follow up with your surgeon in two weeks. Call for appointment.    If you need more pain medications, call your surgeon's office. For medication refills or authorizations call [phone]    Make sure to have a bowel movement by 2 days after surgery. Take stool softners and laxatives as needed.    Call and schedule a follow up appointment with your primary care physician for repeat blood work (CBC and BMP) for post hospital discharge follow-up care.    Call your surgeon if you have increased redness/pain/drainage or fever. Return to ER for shortness of breath/calf tenderness. Follow up with your surgeon in two weeks. Call for appointment.    If you need more pain medications, call your surgeon's office. For medication refills or authorizations call [phone]    Make sure to have a bowel movement by 2 days after surgery. Take stool softners and laxatives as needed.    Call and schedule a follow up appointment with your primary care physician for repeat blood work (CBC and BMP) for post hospital discharge follow-up care.    Call your surgeon if you have increased redness/pain/drainage or fever. Return to ER for shortness of breath/calf tenderness.

## 2024-01-12 NOTE — OCCUPATIONAL THERAPY INITIAL EVALUATION ADULT - WEIGHT-BEARING RESTRICTIONS: SIT/STAND, REHAB EVAL
Anesthesia Post Evaluation    Patient: Michele Perez    Procedure(s) Performed: Procedure(s) (LRB):  CYSTOSCOPY (N/A)  HYDRODISTENTION (N/A)  CYSTOSCOPY, WITH BLADDER BIOPSY, WITH FULGURATION IF INDICATED (N/A)    Final Anesthesia Type: general  Patient location during evaluation: PACU  Patient participation: Yes- Able to Participate  Level of consciousness: awake and alert, oriented and awake  Post-procedure vital signs: reviewed and stable  Pain management: adequate  Airway patency: patent  PONV status at discharge: No PONV  Anesthetic complications: no      Cardiovascular status: blood pressure returned to baseline and hemodynamically stable  Respiratory status: unassisted, spontaneous ventilation and room air  Hydration status: euvolemic  Follow-up not needed.          Vitals Value Taken Time   /82 8/5/2019  4:30 PM   Temp 36.6 °C (97.9 °F) 8/5/2019  4:00 PM   Pulse 77 8/5/2019  4:30 PM   Resp 16 8/5/2019  4:30 PM   SpO2 100 % 8/5/2019  4:30 PM         Event Time     Out of Recovery 16:07:00          Pain/Praful Score: Pain Rating Prior to Med Admin: 4 (8/5/2019  3:49 PM)  Praful Score: 10 (8/5/2019  4:24 PM)        
weight-bearing as tolerated

## 2024-01-12 NOTE — DISCHARGE NOTE NURSING/CASE MANAGEMENT/SOCIAL WORK - NSDCPEFALRISK_GEN_ALL_CORE
For information on Fall & Injury Prevention, visit: [email or web address] OR  [email or web address] OR  https://www.cdc.gov/steadi/patient.html For information on Fall & Injury Prevention, visit: [email or web address] OR  [email or web address] OR  https://www.cdc.gov/steadi/patient.html

## 2024-01-12 NOTE — PHYSICAL THERAPY INITIAL EVALUATION ADULT - ACTIVE RANGE OF MOTION EXAMINATION, REHAB EVAL
R knee 5-75/bilateral upper extremity Active ROM was WFL (within functional limits)/bilateral  lower extremity Active ROM was WFL (within functional limits)/deficits as listed below

## 2024-01-12 NOTE — OCCUPATIONAL THERAPY INITIAL EVALUATION ADULT - ADDITIONAL COMMENTS
Preop confirmed: Pt reports she lives with 2 grandchildren in a private house with 2 steps to enter c no handrails & 13 steps with bilateral close rails to navigate inside. Pt has a walk-in shower, retractable shower head & standard toilet. Pt is independent with ADls and mobility. Pt has no recent PT and has leg buckling. Pt wears glasses, no hearing aids, drives & is right handed. Pt has a PMh of right HARISH about 2 years ago.

## 2024-01-12 NOTE — PROGRESS NOTE ADULT - SUBJECTIVE AND OBJECTIVE BOX
Patient is s/p R TKA under spinal anesthesia POD#1. Pt tolerated procedure well without any intra-op complications. Pt doing well at this time.  Denies CP/SOB/Dizziness/N/V/D/HA. Pain is controlled.     Vital Signs Last 24 Hrs  T(C): 36.7 (12 Jan 2024 09:36), Max: 36.9 (11 Jan 2024 20:00)  T(F): 98.1 (12 Jan 2024 09:36), Max: 98.5 (11 Jan 2024 20:00)  HR: 89 (12 Jan 2024 10:03) (64 - 93)  BP: 141/70 (12 Jan 2024 10:03) (116/73 - 164/84)  BP(mean): --  RR: 18 (12 Jan 2024 10:03) (12 - 18)  SpO2: 94% (12 Jan 2024 10:03) (92% - 99%)    Parameters below as of 12 Jan 2024 10:03  Patient On (Oxygen Delivery Method): room air      GEN: NAD  R Knee: Dressing C/D/I.   B/L LE's: Motor intact + EHL/FHL/TA/GS in the BL LE. Sensation is grossly intact B/L. Extremities warm B/L. Compartments soft, compressible B/L, no calf tenderness B/L. DP 2+ B/L.    Labs:                          12.9   13.48 )-----------( 280      ( 12 Jan 2024 06:41 )             40.6       01-12    141  |  109<H>  |  16  ----------------------------<  141<H>  4.9   |  29  |  0.90    Ca    9.0      12 Jan 2024 06:41        A/P: Patient is a 78y y/o Female s/p R TKA, POD #1  - FU COVID Swab  -wound care, isometric exercises, GI motility, new medications, hospital course and discharge planning reviewed with pt  -Pain control/analgesia  -Inc spirometry reviewed and counseled  -SCD's to B/L LE  -PT/OT/WBAT  -Antibiotic 24hours post-op  -Anticoagulation: ASA BID starting POD#1  Discharge: Rehab pending negative COVID Swab  
ALVIN DOYLE is a 78y Female s/p ROBOTIC ASSISTED RIGHT TOTAL KNEE ARTHROPLASTY WITH DAT        denies any chest pain shortness of breath palpitation dizziness lightheadedness nausea vomiting fever or chills    T(C): 36.3 (01-12-24 @ 04:10), Max: 36.9 (01-11-24 @ 20:00)  HR: 67 (01-12-24 @ 04:10) (64 - 93)  BP: 136/66 (01-12-24 @ 04:10) (125/65 - 164/84)  RR: 18 (01-12-24 @ 04:10) (12 - 18)  SpO2: 95% (01-12-24 @ 04:10) (92% - 99%)  no jvd/bruit  s1 s2 rrr  cta  s/nt/nd  no calf tend                        12.9   13.48 )-----------( 280      ( 12 Jan 2024 06:41 )             40.6   01-12    141  |  109<H>  |  16  ----------------------------<  141<H>  4.9   |  29  |  0.90    Ca    9.0      12 Jan 2024 06:41        cont dvt px  pain control  bowel regimen  antiemetics  incentive spirometer
Patient is s/p R TKA under spinal anesthesia POD#0. Pt tolerated procedure well without any intra-op complications. Pt doing well at this time.  Denies CP/SOB/Dizziness/N/V/D/HA. Pain is controlled.     Vital Signs Last 24 Hrs  T(C): 36.4 (11 Jan 2024 18:30), Max: 36.7 (11 Jan 2024 11:20)  T(F): 97.5 (11 Jan 2024 18:30), Max: 98 (11 Jan 2024 11:20)  HR: 64 (11 Jan 2024 18:30) (64 - 91)  BP: 145/67 (11 Jan 2024 18:30) (136/72 - 160/76)  BP(mean): --  RR: 17 (11 Jan 2024 18:30) (15 - 18)  SpO2: 96% (11 Jan 2024 18:30) (95% - 100%)    Parameters below as of 11 Jan 2024 17:42  Patient On (Oxygen Delivery Method): room air    GEN: NAD  R Knee: Dressing C/D/I.   B/L LE's: Motor intact + EHL/FHL/TA/GS in the BL LE. Sensation is grossly intact B/L. Extremities warm B/L. Compartments soft, compressible B/L, no calf tenderness B/L. DP 2+ B/L.    Labs:                          13.4   5.46  )-----------( 249      ( 11 Jan 2024 17:57 )             41.4       01-11    140  |  110<H>  |  17  ----------------------------<  88  4.3   |  25  |  1.03    Ca    8.6      11 Jan 2024 17:57      A/P: Patient is a 78y y/o Female s/p R TKA, POD #0  -wound care, isometric exercises, GI motility, new medications, hospital course and discharge planning reviewed with pt  -Pain control/analgesia  -SCD's to B/L LE  -F/U AM Labs  -PT/OT/WBAT  -Antibiotic 24hours post-op  -Anticoagulation: ASA BID starting POD#1  Discharge: Pending

## 2024-01-12 NOTE — PHYSICAL THERAPY INITIAL EVALUATION ADULT - STRENGTHENING, PT EVAL
Patient will improve strength in R knee to 5/5 in 4 weeks to improve overall functional mobility including gait, transfers, bed mobility and decrease risk of falls.

## 2024-01-17 LAB — SURGICAL PATHOLOGY STUDY: SIGNIFICANT CHANGE UP

## 2024-01-19 ENCOUNTER — OUTPATIENT (OUTPATIENT)
Dept: OUTPATIENT SERVICES | Facility: HOSPITAL | Age: 79
LOS: 1 days | Discharge: HOME | End: 2024-01-19
Payer: MEDICARE

## 2024-01-19 DIAGNOSIS — Z96.641 PRESENCE OF RIGHT ARTIFICIAL HIP JOINT: Chronic | ICD-10-CM

## 2024-01-19 DIAGNOSIS — M79.661 PAIN IN RIGHT LOWER LEG: ICD-10-CM

## 2024-01-19 DIAGNOSIS — Z98.890 OTHER SPECIFIED POSTPROCEDURAL STATES: Chronic | ICD-10-CM

## 2024-01-19 PROCEDURE — 93971 EXTREMITY STUDY: CPT | Mod: 26,RT

## 2024-01-23 ENCOUNTER — RESULT CHARGE (OUTPATIENT)
Age: 79
End: 2024-01-23

## 2024-01-23 ENCOUNTER — APPOINTMENT (OUTPATIENT)
Dept: ORTHOPEDIC SURGERY | Facility: CLINIC | Age: 79
End: 2024-01-23
Payer: MEDICARE

## 2024-01-23 VITALS — WEIGHT: 122 LBS | HEIGHT: 65 IN | BODY MASS INDEX: 20.33 KG/M2

## 2024-01-23 PROCEDURE — 99024 POSTOP FOLLOW-UP VISIT: CPT

## 2024-01-23 PROCEDURE — 73562 X-RAY EXAM OF KNEE 3: CPT | Mod: 26,RT

## 2024-01-24 DIAGNOSIS — F32.A DEPRESSION, UNSPECIFIED: ICD-10-CM

## 2024-01-24 DIAGNOSIS — M17.11 UNILATERAL PRIMARY OSTEOARTHRITIS, RIGHT KNEE: ICD-10-CM

## 2024-01-24 DIAGNOSIS — Z85.828 PERSONAL HISTORY OF OTHER MALIGNANT NEOPLASM OF SKIN: ICD-10-CM

## 2024-01-24 DIAGNOSIS — Z96.641 PRESENCE OF RIGHT ARTIFICIAL HIP JOINT: ICD-10-CM

## 2024-02-20 ENCOUNTER — APPOINTMENT (OUTPATIENT)
Dept: ORTHOPEDIC SURGERY | Facility: CLINIC | Age: 79
End: 2024-02-20

## 2024-02-20 NOTE — ASSESSMENT
[FreeTextEntry1] : Previous doc: X-rays reveal right inferior superior rami fracture and components well fixed in good position. Recommended take 1 81mg of asprin 2x day. to prevent DVT - No restrictions. Activity as tolerated. 8/21/20: New Xrays reveal routine healing with callus present, Reports improvement but still with sig pain, PT just starting Ptherapy, Prescribed tramadol for pain relief. 6/28/21: Right hip doing well; Continued pain in the right knee - Has had cortisone injection in the past with some relief and would like HA injections today 7/12/21: Inj tolerated well. Asp 5cc. 7/20/21: Inj tolerated well. Asp 5cc. 8/23/21: No relief from orthovisc - cortisone inj today tolerated well. 11/8/21: short term relief from CSI in august. Indicated for Zilretta today to hopefully give her longer relief. Tolerated well. f/up in 3 months. 3/8/22: She has sig swelling in the soft tissue and tenderness a- adv OA but no change and no acute fracture - recommend Ice rest and nsaids as needed - if cont pain in a month will try injection 4/11/22: Pain returned - zilretta right knee tolerated well. Asp 10cc clear yellow fluid. 5/9/22: Severe degen scoliosis causing sciatic symptoms in right leg. Planning to see neuro regarding dizziness and balance difficulty. MDP flexeril and tramadol, PT. Reeval in 4 weeks. 8/16/22: Repeat Zilretta performed today, tolerated well. f/up in 3 months prn 11/28/22: Repeat zilretta right knee tolerated well. RIght shoulder bursitis - prior cuff repair - inj today and if no improvement will send to shoulder specialist. Left shoulder pain as well, will return in 2 weeks for inj if right shoulder is good and left still hurts. 12/12/22: Left shoulder injection tolerated well. If pain persists refer to specialist. 3/13/23: Cortisone inj right knee tolerated well. 5/8/23: Worsening pain in the right knee, repeat XR shows advanced OA. Will repeat Zilretta today as this has given her good relief in the past. She will plan for R TKA late/after the summer. Risks and benefits discussed and all questions answered. She does have DDD in the lspine, she will start PT for this as well as her knee. 8/14/23: Repeat zilretta right knee tolerated well. 10/9/23: Adv varus RT knee OA. Discussed conservative vs surgical tx options- risks and benefits explained. Pt has exhausted conservative tx options including injections and meds. Pt is well informed and would like to proceed with R TKA. Surgical details discussed. All patient questions answered. Will obtain CT scan [facility] protocol. 10/17/23: Explained to her the timing of repeat injections and injections with surgery. Her most recent CSI was 2 months ago. She is also scheduled for R TKA in 2.5 months (January 2024). Will send Voltaren gel to her pharmacy. f/up in 2 months  11/27/23: Worsening pain.  Planning for TKA in Jan.  Will try oxy prn until surgery.  Discussed risks with narcotics and she will use them sparingly.  1/23/24: 2 weeks postop doing well, cont PT.  Seen in wheelchair in office today but has been ambulating with walker and cane.

## 2024-02-20 NOTE — PHYSICAL EXAM
[AP] : anteroposterior [Lateral] : lateral [[place]] : skyline [Components well fixed, in good position] : Components well fixed, in good position [de-identified] : Right knee: Inc healed.  ROM 7-95.  Lig stable.  NVI.  Wheelchair/walker.

## 2024-02-20 NOTE — DISCUSSION/SUMMARY
[de-identified] : The patient is doing well at this time. The patient will be started on a course of physical therapy. I recommended that the patient works on range of motion at home and was shown how to do this. I encouraged the patient to increase ambulation. The patient can continue to take Tylenol for occasional discomfort. The patient was advised not to do any dental work for the first three months following the surgery. We will see the patient  back for a follow-up for a repeat evaluation. The patient  will call or return earlier for any questions or concerns.  Signs and symptoms of infection reviewed and patient advised to call immediately for redness, fevers, and/or chills.

## 2024-02-20 NOTE — HISTORY OF PRESENT ILLNESS
[Dull/Aching] : dull/aching [] : Post Surgical Visit: yes [de-identified] : 1/23/24: 2 weeks s/p right TKA pain tolerable, still at rehab.  No fevers/chills.   Previous doc: 7/21/20: Ms. Gertrudis Jay, a 74-year-old female, presents today for right hip and pelvis pain after tripping over her dog on 7/4/20. Known to me for right HARISH in 2018. 1 week s/p ORIF of her right wrist by  8/21/20: Pt present for f/u of Closed fx of ramus of right pubis, Pt reports some improvement and more ROM, Only started Ptherapy today. 6/28/21: Continued pain in the right knee - Here for HA injections 7/12/21: Orthovisc #3 right knee. 7/20/21: Orthovisc #4 right knee, no change. 08/23/21: s/p completing series of orthovisc injections, feels that she has not yet obtained any relief. Feels that her right knee frequently will buckle , mostly after getting up from any sustained sitting. Overall pain is the same that it had been, now with nocturnal awakening. 11/8/21: f/up for R knee. She states the pain returned over the past few weeks with no new injury. 3/8/22: 77yo F, known to Dr. Claros, with R knee pain after a fall down stairs on 3/4/22. She was initially seen at the ER and placed in a brace. she has pain in the knee but better than from when she fell but sig pain even with standing  4/11/22: RIght knee pain worsening recently, zilretta last time helped a lot. 5/9/22: Right knee better since zilretta.  Fell 3 weeks ago and since then having pain from right hip down right leg to foot.  Feels she is having difficulty with balance. 8/16/22: Here for follow up R knee. German has been beneficial to her. Would like to repeat today. Orthovisc provided no relief in the past  11/28/22: RIght knee pain returned.  B/L shoulder pain as well, right > left. 12/12/22: RIght shoulder and right knee are somewhat better.  Cont left shoulder pain. 3/13/23: Right knee pain returned. 5/8/23: Worsening pain in the right knee. No relief from previous injection .8/14/23: Inj helped a lot but pain returned.  Helped for about 3 months. 10/9/23: Here to f/up RT knee. Continued pain. inj helped a little bit but pain is worse, affecting her daily. She would like to plan for R TKA.  11/27/23: Worsening pain, planning for right TKA 1/11/24.  DId not get CT done yet. [de-identified] : rehab [de-identified] : 1/11/24 monthly

## 2024-02-26 ENCOUNTER — APPOINTMENT (OUTPATIENT)
Dept: ORTHOPEDIC SURGERY | Facility: CLINIC | Age: 79
End: 2024-02-26

## 2024-03-12 ENCOUNTER — APPOINTMENT (OUTPATIENT)
Dept: ORTHOPEDIC SURGERY | Facility: CLINIC | Age: 79
End: 2024-03-12

## 2024-03-25 ENCOUNTER — APPOINTMENT (OUTPATIENT)
Dept: ORTHOPEDIC SURGERY | Facility: CLINIC | Age: 79
End: 2024-03-25
Payer: MEDICARE

## 2024-03-25 VITALS — WEIGHT: 122 LBS | BODY MASS INDEX: 20.33 KG/M2 | HEIGHT: 65 IN

## 2024-03-25 PROCEDURE — 73562 X-RAY EXAM OF KNEE 3: CPT | Mod: RT

## 2024-03-25 PROCEDURE — 99024 POSTOP FOLLOW-UP VISIT: CPT

## 2024-03-25 NOTE — HISTORY OF PRESENT ILLNESS
[de-identified] : 3/25/24: 10 weeks postop, stopped PT because had to get emergency pacemaker.  Min knee pain.   Previous doc: 7/21/20: Ms. Gertrudis Jay, a 74-year-old female, presents today for right hip and pelvis pain after tripping over her dog on 7/4/20. Known to me for right HARISH in 2018. 1 week s/p ORIF of her right wrist by  8/21/20: Pt present for f/u of Closed fx of ramus of right pubis, Pt reports some improvement and more ROM, Only started Ptherapy today. 6/28/21: Continued pain in the right knee - Here for HA injections 7/12/21: Orthovisc #3 right knee. 7/20/21: Orthovisc #4 right knee, no change. 08/23/21: s/p completing series of orthovisc injections, feels that she has not yet obtained any relief. Feels that her right knee frequently will buckle , mostly after getting up from any sustained sitting. Overall pain is the same that it had been, now with nocturnal awakening. 11/8/21: f/up for R knee. She states the pain returned over the past few weeks with no new injury. 3/8/22: 77yo F, known to Dr. Claros, with R knee pain after a fall down stairs on 3/4/22. She was initially seen at the ER and placed in a brace. she has pain in the knee but better than from when she fell but sig pain even with standing  4/11/22: RIght knee pain worsening recently, zilretta last time helped a lot. 5/9/22: Right knee better since zilretta.  Fell 3 weeks ago and since then having pain from right hip down right leg to foot.  Feels she is having difficulty with balance. 8/16/22: Here for follow up R knee. German has been beneficial to her. Would like to repeat today. Orthovisc provided no relief in the past  11/28/22: RIght knee pain returned.  B/L shoulder pain as well, right > left. 12/12/22: RIght shoulder and right knee are somewhat better.  Cont left shoulder pain. 3/13/23: Right knee pain returned. 5/8/23: Worsening pain in the right knee. No relief from previous injection .8/14/23: Inj helped a lot but pain returned.  Helped for about 3 months. 10/9/23: Here to f/up RT knee. Continued pain. inj helped a little bit but pain is worse, affecting her daily. She would like to plan for R TKA.  11/27/23: Worsening pain, planning for right TKA 1/11/24.  DId not get CT done yet. 1/23/24: 2 weeks s/p right TKA pain tolerable, still at rehab.  No fevers/chills.

## 2024-03-25 NOTE — ASSESSMENT
[FreeTextEntry1] : Previous doc: X-rays reveal right inferior superior rami fracture and components well fixed in good position. Recommended take 1 81mg of asprin 2x day. to prevent DVT - No restrictions. Activity as tolerated. 8/21/20: New Xrays reveal routine healing with callus present, Reports improvement but still with sig pain, PT just starting Ptherapy, Prescribed tramadol for pain relief. 6/28/21: Right hip doing well; Continued pain in the right knee - Has had cortisone injection in the past with some relief and would like HA injections today 7/12/21: Inj tolerated well. Asp 5cc. 7/20/21: Inj tolerated well. Asp 5cc. 8/23/21: No relief from orthovisc - cortisone inj today tolerated well. 11/8/21: short term relief from CSI in august. Indicated for Zilretta today to hopefully give her longer relief. Tolerated well. f/up in 3 months. 3/8/22: She has sig swelling in the soft tissue and tenderness a- adv OA but no change and no acute fracture - recommend Ice rest and nsaids as needed - if cont pain in a month will try injection 4/11/22: Pain returned - zilretta right knee tolerated well. Asp 10cc clear yellow fluid. 5/9/22: Severe degen scoliosis causing sciatic symptoms in right leg. Planning to see neuro regarding dizziness and balance difficulty. MDP flexeril and tramadol, PT. Reeval in 4 weeks. 8/16/22: Repeat Zilretta performed today, tolerated well. f/up in 3 months prn 11/28/22: Repeat zilretta right knee tolerated well. RIght shoulder bursitis - prior cuff repair - inj today and if no improvement will send to shoulder specialist. Left shoulder pain as well, will return in 2 weeks for inj if right shoulder is good and left still hurts. 12/12/22: Left shoulder injection tolerated well. If pain persists refer to specialist. 3/13/23: Cortisone inj right knee tolerated well. 5/8/23: Worsening pain in the right knee, repeat XR shows advanced OA. Will repeat Zilretta today as this has given her good relief in the past. She will plan for R TKA late/after the summer. Risks and benefits discussed and all questions answered. She does have DDD in the lspine, she will start PT for this as well as her knee. 8/14/23: Repeat zilretta right knee tolerated well. 10/9/23: Adv varus RT knee OA. Discussed conservative vs surgical tx options- risks and benefits explained. Pt has exhausted conservative tx options including injections and meds. Pt is well informed and would like to proceed with R TKA. Surgical details discussed. All patient questions answered. Will obtain CT scan DAT protocol. 10/17/23: Explained to her the timing of repeat injections and injections with surgery. Her most recent CSI was 2 months ago. She is also scheduled for R TKA in 2.5 months (January 2024). Will send Voltaren gel to her pharmacy. f/up in 2 months  11/27/23: Worsening pain.  Planning for TKA in Jan.  Will try oxy prn until surgery.  Discussed risks with narcotics and she will use them sparingly. 1/23/24: 2 weeks postop doing well, cont PT.  Seen in wheelchair in office today but has been ambulating with walker and cane.  3/25/24: Doing very well, min pain, cont HEP, return at 1 year postop.  Had recent cardiac pacemaker and is continuing workup for this with cardiologist.

## 2024-03-25 NOTE — PHYSICAL EXAM
[AP] : anteroposterior [Lateral] : lateral [[place]] : skyline [Components well fixed, in good position] : Components well fixed, in good position [de-identified] : Right knee: Inc healed.  No effusion.  ROM 0-125.  Lig stable.  NVI.  Walking without assistance.

## 2024-03-25 NOTE — DISCUSSION/SUMMARY
[de-identified] : The patient was advised of the diagnosis.  The natural history of the pathology was explained in full to the patient in layman's terms. All questions were answered.  The risks and benefits of surgical and non-surgical treatment alternatives were explained in full to the patient.

## 2024-05-03 NOTE — PHYSICAL THERAPY INITIAL EVALUATION ADULT - ADDITIONAL COMMENTS
Pt lives alone in a private home, 2 entry steps (no rails), 13 steps (+ B/L rails) to 2nd level. PT recommended railing installation: pt refusing. Pt states she is independent with all functional mobility including community ambulation without a device. Pt states she owns crutches. Pt is right hand dominant, wears eye glasses for reading & distance, drives, & is retired. Pt reports 10/10 with Oxy. Pt requesting rehab due to stairs at home. 2-/5 2-/5/grossly assessed due to

## 2024-05-21 ENCOUNTER — RESULT CHARGE (OUTPATIENT)
Age: 79
End: 2024-05-21

## 2024-05-21 ENCOUNTER — APPOINTMENT (OUTPATIENT)
Dept: ORTHOPEDIC SURGERY | Facility: CLINIC | Age: 79
End: 2024-05-21
Payer: MEDICARE

## 2024-05-21 VITALS — BODY MASS INDEX: 20.33 KG/M2 | HEIGHT: 65 IN | WEIGHT: 122 LBS

## 2024-05-21 PROCEDURE — 73630 X-RAY EXAM OF FOOT: CPT | Mod: LT

## 2024-05-21 PROCEDURE — 73562 X-RAY EXAM OF KNEE 3: CPT | Mod: RT

## 2024-05-21 PROCEDURE — L3260: CPT | Mod: KX,LT

## 2024-05-21 PROCEDURE — 99213 OFFICE O/P EST LOW 20 MIN: CPT | Mod: 57

## 2024-05-21 NOTE — END OF VISIT
[FreeTextEntry3] : I Dr. Claros, personally performed the evaluation and management services for this established patient who present today with a new problem/exacerbation of an existing condition. The E/M includes conducting the examination, assessing all new/exacerbated conditions, and establishing a new plan of care. Today, my KAREN, Abelardo Perez, was here to observe in my evaluation and management services of this new problem/exacerbated condition to be followed going forward.

## 2024-05-21 NOTE — DISCUSSION/SUMMARY
[de-identified] : The patient was advised of the diagnosis.  The natural history of the pathology was explained in full to the patient in layman's terms. All questions were answered.  The risks and benefits of surgical and non-surgical treatment alternatives were explained in full to the patient.

## 2024-05-21 NOTE — HISTORY OF PRESENT ILLNESS
[Dull/Aching] : dull/aching [de-identified] : 5/21/24: Worsening right knee pain lately.  No injury.  Intermittent.  Left foot pain x 2-3 weeks, banged against something while walking.   Previous doc: 7/21/20: Ms. Gertrudis Jay, a 74-year-old female, presents today for right hip and pelvis pain after tripping over her dog on 7/4/20. Known to me for right HARISH in 2018. 1 week s/p ORIF of her right wrist by  8/21/20: Pt present for f/u of Closed fx of ramus of right pubis, Pt reports some improvement and more ROM, Only started Ptherapy today. 6/28/21: Continued pain in the right knee - Here for HA injections 7/12/21: Orthovisc #3 right knee. 7/20/21: Orthovisc #4 right knee, no change. 08/23/21: s/p completing series of orthovisc injections, feels that she has not yet obtained any relief. Feels that her right knee frequently will buckle , mostly after getting up from any sustained sitting. Overall pain is the same that it had been, now with nocturnal awakening. 11/8/21: f/up for R knee. She states the pain returned over the past few weeks with no new injury. 3/8/22: 75yo F, known to Dr. Claros, with R knee pain after a fall down stairs on 3/4/22. She was initially seen at the ER and placed in a brace. she has pain in the knee but better than from when she fell but sig pain even with standing  4/11/22: RIght knee pain worsening recently, zilretta last time helped a lot. 5/9/22: Right knee better since zilretta.  Fell 3 weeks ago and since then having pain from right hip down right leg to foot.  Feels she is having difficulty with balance. 8/16/22: Here for follow up R knee. German has been beneficial to her. Would like to repeat today. Orthovisc provided no relief in the past  11/28/22: RIght knee pain returned.  B/L shoulder pain as well, right > left. 12/12/22: RIght shoulder and right knee are somewhat better.  Cont left shoulder pain. 3/13/23: Right knee pain returned. 5/8/23: Worsening pain in the right knee. No relief from previous injection .8/14/23: Inj helped a lot but pain returned.  Helped for about 3 months. 10/9/23: Here to f/up RT knee. Continued pain. inj helped a little bit but pain is worse, affecting her daily. She would like to plan for R TKA.  11/27/23: Worsening pain, planning for right TKA 1/11/24.  DId not get CT done yet. 1/23/24: 2 weeks s/p right TKA pain tolerable, still at rehab.  No fevers/chills. 3/25/24: 10 weeks postop, stopped PT because had to get emergency pacemaker.  Min knee pain.

## 2024-05-21 NOTE — IMAGING
[Right] : right knee [AP] : anteroposterior [Lateral] : lateral [[place]] : skyline [Components well fixed, in good position] : Components well fixed, in good position [Left] : left foot [The fracture is in acceptable alignment. There is progression in healing seen] : The fracture is in acceptable alignment. There is progression in healing seen [de-identified] : Right knee; Inc healed.  No effusion.  No tenderness.  ROM 0-125.  Lig stable.  NVI.  Walks without assistance.  Left foot.  Lateral swelling.  tenderness head of 5th metatarsal.  NVI. All other review of systems negative, except as noted in HPI

## 2024-05-21 NOTE — ASSESSMENT
[FreeTextEntry1] : Previous doc: X-rays reveal right inferior superior rami fracture and components well fixed in good position. Recommended take 1 81mg of asprin 2x day. to prevent DVT - No restrictions. Activity as tolerated. 8/21/20: New Xrays reveal routine healing with callus present, Reports improvement but still with sig pain, PT just starting Ptherapy, Prescribed tramadol for pain relief. 6/28/21: Right hip doing well; Continued pain in the right knee - Has had cortisone injection in the past with some relief and would like HA injections today 7/12/21: Inj tolerated well. Asp 5cc. 7/20/21: Inj tolerated well. Asp 5cc. 8/23/21: No relief from orthovisc - cortisone inj today tolerated well. 11/8/21: short term relief from CSI in august. Indicated for Zilretta today to hopefully give her longer relief. Tolerated well. f/up in 3 months. 3/8/22: She has sig swelling in the soft tissue and tenderness a- adv OA but no change and no acute fracture - recommend Ice rest and nsaids as needed - if cont pain in a month will try injection 4/11/22: Pain returned - zilretta right knee tolerated well. Asp 10cc clear yellow fluid. 5/9/22: Severe degen scoliosis causing sciatic symptoms in right leg. Planning to see neuro regarding dizziness and balance difficulty. MDP flexeril and tramadol, PT. Reeval in 4 weeks. 8/16/22: Repeat Zilretta performed today, tolerated well. f/up in 3 months prn 11/28/22: Repeat zilretta right knee tolerated well. RIght shoulder bursitis - prior cuff repair - inj today and if no improvement will send to shoulder specialist. Left shoulder pain as well, will return in 2 weeks for inj if right shoulder is good and left still hurts. 12/12/22: Left shoulder injection tolerated well. If pain persists refer to specialist. 3/13/23: Cortisone inj right knee tolerated well. 5/8/23: Worsening pain in the right knee, repeat XR shows advanced OA. Will repeat Zilretta today as this has given her good relief in the past. She will plan for R TKA late/after the summer. Risks and benefits discussed and all questions answered. She does have DDD in the lspine, she will start PT for this as well as her knee. 8/14/23: Repeat zilretta right knee tolerated well. 10/9/23: Adv varus RT knee OA. Discussed conservative vs surgical tx options- risks and benefits explained. Pt has exhausted conservative tx options including injections and meds. Pt is well informed and would like to proceed with R TKA. Surgical details discussed. All patient questions answered. Will obtain CT scan DAT protocol. 10/17/23: Explained to her the timing of repeat injections and injections with surgery. Her most recent CSI was 2 months ago. She is also scheduled for R TKA in 2.5 months (January 2024). Will send Voltaren gel to her pharmacy. f/up in 2 months  11/27/23: Worsening pain.  Planning for TKA in Jan.  Will try oxy prn until surgery.  Discussed risks with narcotics and she will use them sparingly. 1/23/24: 2 weeks postop doing well, cont PT.  Seen in wheelchair in office today but has been ambulating with walker and cane. 3/25/24: Doing very well, min pain, cont HEP, return at 1 year postop.  Had recent cardiac pacemaker and is continuing workup for this with cardiologist.  5/21/24: Recent onset right knee pain, no acute issues seen.  Recc PT for strengthening.  Left foot pain since trauma - has 5th met head fx nondisplaced and already some healing - recc WBAT in postop shoe and repeat XR in 4 weeks.

## 2024-05-28 RX ORDER — IBUPROFEN 600 MG/1
600 TABLET, FILM COATED ORAL
Qty: 90 | Refills: 0 | Status: ACTIVE | COMMUNITY
Start: 2024-05-28 | End: 1900-01-01

## 2024-06-06 NOTE — H&P PST ADULT - RESPIRATORY
Detail Level: Detailed detailed exam

## 2024-06-24 ENCOUNTER — APPOINTMENT (OUTPATIENT)
Dept: ORTHOPEDIC SURGERY | Facility: CLINIC | Age: 79
End: 2024-06-24
Payer: MEDICARE

## 2024-06-24 VITALS — BODY MASS INDEX: 21.33 KG/M2 | WEIGHT: 128 LBS | HEIGHT: 65 IN

## 2024-06-24 DIAGNOSIS — Z96.651 PRESENCE OF RIGHT ARTIFICIAL KNEE JOINT: ICD-10-CM

## 2024-06-24 DIAGNOSIS — S92.355A NONDISPLACED FRACTURE OF FIFTH METATARSAL BONE, LEFT FOOT, INITIAL ENCOUNTER FOR CLOSED FRACTURE: ICD-10-CM

## 2024-06-24 DIAGNOSIS — M17.11 UNILATERAL PRIMARY OSTEOARTHRITIS, RIGHT KNEE: ICD-10-CM

## 2024-06-24 PROCEDURE — 73630 X-RAY EXAM OF FOOT: CPT | Mod: LT

## 2024-06-24 PROCEDURE — 99213 OFFICE O/P EST LOW 20 MIN: CPT

## 2024-11-18 NOTE — PHYSICAL EXAM
loose teeth [Right] : right knee [] : ligamentously stable [[id number]] : flexion 125 degrees [de-identified] : extension 0 degrees

## 2024-12-06 ENCOUNTER — RX RENEWAL (OUTPATIENT)
Age: 79
End: 2024-12-06

## 2024-12-14 ENCOUNTER — NON-APPOINTMENT (OUTPATIENT)
Age: 79
End: 2024-12-14

## 2025-03-18 NOTE — H&P PST ADULT - NS PRO LACT YNNA
"Bronchoscopy Risk Assessment Guidelines      A. Patient symptoms to consider when assessing pulmonary TB risk are:    I. Cough greater than 3 weeks; and fever, hemoptysis, pleuritic chest    pain, weight loss greater than 10 lbs, night sweats, fatigue, infiltrates on    upper lobes or superior segments of lower lobes, cavitation on chest    x-ray.   B. Patient risk factors to consider when assessing pulmonary TB risk are:    I. Exposure to known TB case, foreign-born persons (within 5 years of    arrival to [place]), residence in a crowded setting (correctional facility,     long-term care center, etc.), persons with HIV or immunosuppression.    Patients with symptoms and risk factors should generally be considered \"suspect risk\" and bronchoscopies should be performed in airborne precautions.        Specimens sent: no  Complications: None  Scope used: Disposable 2.7  Attending Physician: Dr. Roman Urrutia, RT on 3/18/2025 at 2:36 PM  " no

## (undated) DEVICE — MEDICATION LABELS W MARKER

## (undated) DEVICE — CRYO/CUFF GRAVITY COOLER KNEE LARGE

## (undated) DEVICE — MIXER BONE CEMENT EVAC III

## (undated) DEVICE — MAKO DRAPE KIT

## (undated) DEVICE — WARMING BLANKET UPPER ADULT

## (undated) DEVICE — DRAPE SURGICAL #1010

## (undated) DEVICE — DRSG COBAN 6"

## (undated) DEVICE — MAKO BLADE STANDARD

## (undated) DEVICE — MAKO VIZADISC KNEE TRACKING KIT

## (undated) DEVICE — NDL HYPO SAFE 22G X 1.5" (BLACK)

## (undated) DEVICE — ZIMMER PULSAVAC PLUS FAN KIT

## (undated) DEVICE — SUCTION TIP KAMVAC MINI

## (undated) DEVICE — FRA-ESU BOVIE FORCE TRIAD T7J19717DX: Type: DURABLE MEDICAL EQUIPMENT

## (undated) DEVICE — PREP SCRUB BRUSH W CHG 4%

## (undated) DEVICE — DRSG TELFA 3 X 8

## (undated) DEVICE — POSITIONER FOAM BUMP FLAT TOP 10X6X4" LRG

## (undated) DEVICE — SAW BLADE STRYKER SAGITTAL 25X0.89X75MM

## (undated) DEVICE — HOOD T5 PEELAWAY

## (undated) DEVICE — SOL IRR BAG NS 0.9% 3000ML

## (undated) DEVICE — SUT STRATAFIX SYMMETRIC PDS PLUS 1 18" CTX VIOLET

## (undated) DEVICE — SUT STRATAFIX SPIRAL MONOCRYL PLUS 4-0 45CM PS-2 UNDYED

## (undated) DEVICE — TOURNIQUET ESMARK 6"

## (undated) DEVICE — MAKO CHECKPOINT KIT FEMORAL / TIBIAL

## (undated) DEVICE — SUT STRATAFIX SPIRAL PDS PLUS 2-0 45CM CT-1

## (undated) DEVICE — SYR LUER LOK 20CC

## (undated) DEVICE — GLV 8.5 PROTEXIS (WHITE)

## (undated) DEVICE — SYR ASEPTO

## (undated) DEVICE — DRSG ACE BANDAGE 6"

## (undated) DEVICE — MAKO BLADE NARROW

## (undated) DEVICE — DRSG DERMABOND PRINEO 22CM

## (undated) DEVICE — PACK TOTAL KNEE

## (undated) DEVICE — VENODYNE/SCD SLEEVE CALF MEDIUM

## (undated) DEVICE — SUT VICRYL 0 27" CT-1 UNDYED